# Patient Record
Sex: MALE | Race: WHITE | NOT HISPANIC OR LATINO | Employment: UNEMPLOYED | URBAN - METROPOLITAN AREA
[De-identification: names, ages, dates, MRNs, and addresses within clinical notes are randomized per-mention and may not be internally consistent; named-entity substitution may affect disease eponyms.]

---

## 2022-06-07 ENCOUNTER — TRANSFERRED RECORDS (OUTPATIENT)
Dept: HEALTH INFORMATION MANAGEMENT | Facility: CLINIC | Age: 62
End: 2022-06-07

## 2022-06-07 ENCOUNTER — HOSPITAL ENCOUNTER (EMERGENCY)
Facility: CLINIC | Age: 62
Discharge: HOME | End: 2022-06-07
Attending: EMERGENCY MEDICINE | Admitting: EMERGENCY MEDICINE
Payer: COMMERCIAL

## 2022-06-07 VITALS
WEIGHT: 175 LBS | SYSTOLIC BLOOD PRESSURE: 116 MMHG | BODY MASS INDEX: 22.46 KG/M2 | DIASTOLIC BLOOD PRESSURE: 82 MMHG | HEART RATE: 58 BPM | HEIGHT: 74 IN | RESPIRATION RATE: 15 BRPM | OXYGEN SATURATION: 96 % | TEMPERATURE: 97.2 F

## 2022-06-07 DIAGNOSIS — I48.0 PAROXYSMAL ATRIAL FIBRILLATION (H): ICD-10-CM

## 2022-06-07 LAB
ALBUMIN SERPL-MCNC: 3.6 G/DL (ref 3.4–5)
ALP SERPL-CCNC: 73 U/L (ref 40–150)
ALT SERPL W P-5'-P-CCNC: 28 U/L (ref 0–70)
ANION GAP SERPL CALCULATED.3IONS-SCNC: 6 MMOL/L (ref 3–14)
AST SERPL W P-5'-P-CCNC: 26 U/L (ref 0–45)
ATRIAL RATE - MUSE: 70 BPM
BASOPHILS # BLD AUTO: 0 10E3/UL (ref 0–0.2)
BASOPHILS NFR BLD AUTO: 1 %
BILIRUB SERPL-MCNC: 1.2 MG/DL (ref 0.2–1.3)
BUN SERPL-MCNC: 24 MG/DL (ref 7–30)
CALCIUM SERPL-MCNC: 8.7 MG/DL (ref 8.5–10.1)
CHLORIDE BLD-SCNC: 108 MMOL/L (ref 94–109)
CO2 SERPL-SCNC: 26 MMOL/L (ref 20–32)
CREAT SERPL-MCNC: 0.93 MG/DL (ref 0.66–1.25)
CREATININE (EXTERNAL): 1.1 MG/DL (ref 0.7–1.25)
DIASTOLIC BLOOD PRESSURE - MUSE: NORMAL MMHG
EOSINOPHIL # BLD AUTO: 0.2 10E3/UL (ref 0–0.7)
EOSINOPHIL NFR BLD AUTO: 3 %
ERYTHROCYTE [DISTWIDTH] IN BLOOD BY AUTOMATED COUNT: 13.1 % (ref 10–15)
GFR ESTIMATED (EXTERNAL): 72 ML/MIN/1.73M2
GFR ESTIMATED (IF AFRICAN AMERICAN) (EXTERNAL): 83 ML/MIN/1.73M2
GFR SERPL CREATININE-BSD FRML MDRD: >90 ML/MIN/1.73M2
GLUCOSE (EXTERNAL): 99 MG/DL (ref 65–99)
GLUCOSE BLD-MCNC: 95 MG/DL (ref 70–99)
HCT VFR BLD AUTO: 50.6 % (ref 40–53)
HGB BLD-MCNC: 16.7 G/DL (ref 13.3–17.7)
HOLD SPECIMEN: NORMAL
HOLD SPECIMEN: NORMAL
IMM GRANULOCYTES # BLD: 0 10E3/UL
IMM GRANULOCYTES NFR BLD: 0 %
INTERPRETATION ECG - MUSE: NORMAL
LYMPHOCYTES # BLD AUTO: 1.7 10E3/UL (ref 0.8–5.3)
LYMPHOCYTES NFR BLD AUTO: 28 %
MCH RBC QN AUTO: 29.3 PG (ref 26.5–33)
MCHC RBC AUTO-ENTMCNC: 33 G/DL (ref 31.5–36.5)
MCV RBC AUTO: 89 FL (ref 78–100)
MONOCYTES # BLD AUTO: 0.7 10E3/UL (ref 0–1.3)
MONOCYTES NFR BLD AUTO: 12 %
NEUTROPHILS # BLD AUTO: 3.5 10E3/UL (ref 1.6–8.3)
NEUTROPHILS NFR BLD AUTO: 56 %
NRBC # BLD AUTO: 0 10E3/UL
NRBC BLD AUTO-RTO: 0 /100
P AXIS - MUSE: 65 DEGREES
PLATELET # BLD AUTO: 211 10E3/UL (ref 150–450)
POTASSIUM (EXTERNAL): 4.5 MMOL/L (ref 3.5–5.3)
POTASSIUM BLD-SCNC: 4.2 MMOL/L (ref 3.4–5.3)
PR INTERVAL - MUSE: 160 MS
PROT SERPL-MCNC: 6.8 G/DL (ref 6.8–8.8)
QRS DURATION - MUSE: 72 MS
QT - MUSE: 374 MS
QTC - MUSE: 403 MS
R AXIS - MUSE: 55 DEGREES
RBC # BLD AUTO: 5.7 10E6/UL (ref 4.4–5.9)
SODIUM SERPL-SCNC: 140 MMOL/L (ref 133–144)
SYSTOLIC BLOOD PRESSURE - MUSE: NORMAL MMHG
T AXIS - MUSE: 49 DEGREES
TROPONIN I SERPL HS-MCNC: 46 NG/L
TSH SERPL DL<=0.005 MIU/L-ACNC: 1.16 MU/L (ref 0.4–4)
TSH SERPL-ACNC: 1.35 MIU/L (ref 0.4–4.5)
VENTRICULAR RATE- MUSE: 70 BPM
WBC # BLD AUTO: 6.2 10E3/UL (ref 4–11)

## 2022-06-07 PROCEDURE — 92960 CARDIOVERSION ELECTRIC EXT: CPT

## 2022-06-07 PROCEDURE — 93005 ELECTROCARDIOGRAM TRACING: CPT

## 2022-06-07 PROCEDURE — 250N000013 HC RX MED GY IP 250 OP 250 PS 637: Performed by: EMERGENCY MEDICINE

## 2022-06-07 PROCEDURE — 36415 COLL VENOUS BLD VENIPUNCTURE: CPT | Performed by: EMERGENCY MEDICINE

## 2022-06-07 PROCEDURE — 84443 ASSAY THYROID STIM HORMONE: CPT | Performed by: EMERGENCY MEDICINE

## 2022-06-07 PROCEDURE — C9803 HOPD COVID-19 SPEC COLLECT: HCPCS

## 2022-06-07 PROCEDURE — 96374 THER/PROPH/DIAG INJ IV PUSH: CPT

## 2022-06-07 PROCEDURE — 250N000011 HC RX IP 250 OP 636: Performed by: EMERGENCY MEDICINE

## 2022-06-07 PROCEDURE — 99291 CRITICAL CARE FIRST HOUR: CPT | Mod: 25

## 2022-06-07 PROCEDURE — 84484 ASSAY OF TROPONIN QUANT: CPT | Performed by: EMERGENCY MEDICINE

## 2022-06-07 PROCEDURE — 85025 COMPLETE CBC W/AUTO DIFF WBC: CPT | Performed by: EMERGENCY MEDICINE

## 2022-06-07 PROCEDURE — 80053 COMPREHEN METABOLIC PANEL: CPT | Performed by: EMERGENCY MEDICINE

## 2022-06-07 RX ORDER — PROPOFOL 10 MG/ML
INJECTION, EMULSION INTRAVENOUS DAILY PRN
Status: COMPLETED | OUTPATIENT
Start: 2022-06-07 | End: 2022-06-07

## 2022-06-07 RX ORDER — PROPOFOL 10 MG/ML
INJECTION, EMULSION INTRAVENOUS
Status: DISCONTINUED
Start: 2022-06-07 | End: 2022-06-07 | Stop reason: HOSPADM

## 2022-06-07 RX ORDER — PROPOFOL 10 MG/ML
80 INJECTION, EMULSION INTRAVENOUS ONCE
Status: DISCONTINUED | OUTPATIENT
Start: 2022-06-07 | End: 2022-06-07

## 2022-06-07 RX ADMIN — PROPOFOL 80 MG: 10 INJECTION, EMULSION INTRAVENOUS at 15:35

## 2022-06-07 RX ADMIN — APIXABAN 5 MG: 5 TABLET, FILM COATED ORAL at 16:08

## 2022-06-07 ASSESSMENT — ENCOUNTER SYMPTOMS
SHORTNESS OF BREATH: 1
BLOOD IN STOOL: 0
FEVER: 0
PALPITATIONS: 1
BACK PAIN: 0
MYALGIAS: 0
CHILLS: 0
DYSURIA: 0
ARTHRALGIAS: 0
VOMITING: 0
DIARRHEA: 0
LIGHT-HEADEDNESS: 1
NAUSEA: 0
FATIGUE: 1

## 2022-06-07 NOTE — ED PROVIDER NOTES
History     Chief Complaint:  Palpitations     HPI:  The history is provided by the patient.      Asher Cross is a 61 year old male with a history of hyperlipidemia and mitral valve prolapse who presents with fatigue, palpitations, and shortness of breath which began this morning as he was walking his dog. Shortly after his symptoms began, he called his primary clinic and was able to see his primary doctor this morning. An EKG was obtained during his visit this morning and was ultimately diagnosed with new onset atrial fibrillation. He was subsequently sent here to the ED for further evaluation. Asher reports that he walks his dog every morning and has never had these symptoms before, nor did he have an atrial fibrillation diagnosis prior to today. He has had no associated chest pain, nausea, vomiting, vision changes, back pain, left arm pain, or any other pain.  Asher does note that he went for a 10-mile run 2 days ago and felt well at that time. He also felt well yesterday and strongly believes that his symptoms are new as of this morning. Here in the ED while resting on the gurney, Asher reports that he is experiencing mild shortness of breath and lightheadedness. He denies any recent illness or symptoms such as fever, chills, generalized body aches, diarrhea, black/bloody stools, dysuria, or other stool/urine changes. He has had no known exposures to COVID-19. He does note that he takes a statin for high cholesterol as well as Cialis. He did take a Cialis last night, consumed some alcohol, and had sexual intercourse. He does not use drugs and has an average of 1 alcoholic drink per day. He denies history of surgeries.     Review of Systems   Constitutional: Positive for fatigue. Negative for chills and fever.   Eyes: Negative for visual disturbance.   Respiratory: Positive for shortness of breath.    Cardiovascular: Positive for palpitations. Negative for chest pain.   Gastrointestinal: Negative for blood in  "stool, diarrhea, nausea and vomiting.   Genitourinary: Negative for dysuria.   Musculoskeletal: Negative for arthralgias, back pain and myalgias.   Neurological: Positive for light-headedness.   All other systems reviewed and are negative.    Allergies:  The patient has no known allergies.     Medications:  Statin, unspecified  Cialis    Past Medical History:     Hyperlipidemia  Mitral valve prolapse    Social History:  The patient presents to the ED alone.  He does not use drugs.  On average, he has 1 alcoholic drink per day.    Physical Exam     Patient Vitals for the past 24 hrs:   BP Temp Temp src Pulse Resp SpO2 Height Weight   06/07/22 1500 -- -- -- 94 12 98 % -- --   06/07/22 1400 -- -- -- 85 17 98 % -- --   06/07/22 1330 -- -- -- 83 19 99 % -- --   06/07/22 1300 -- -- -- -- -- 100 % -- --   06/07/22 1245 130/89 -- -- -- -- -- -- --   06/07/22 1148 117/77 -- -- 71 -- -- 1.88 m (6' 2\") 79.4 kg (175 lb)   06/07/22 1147 -- 97.2  F (36.2  C) Temporal -- 20 100 % -- --     Physical Exam  General: Resting comfortably on the gurney  Head:  The scalp, face, and head appear normal  Eyes:  The pupils are equal, round, and reactive to light    There is no nystagmus    Extraocular muscles are intact    Conjunctivae and sclerae are normal  ENT:    The nose is normal    Pinnae are normal    The oropharynx is normal    Uvula is in the midline  Neck:  Normal range of motion    There is no rigidity noted    There is no midline cervical spine pain/tenderness    Trachea is in the midline    No mass is detected  CV:  Irregular rhythm with regular rate in the 70's-80's    Normal S1/S2, no S3    No pathological murmur detected  Resp:  Lungs are clear    There is no tachypnea    Non-labored    No rales    No wheezing   GI:  Abdomen is soft, there is no rigidity    No distension    No tympani    No rebound tenderness     Non-surgical without peritoneal features  MS:  Normal muscular tone    Symmetric motor strength    No major joint " effusions    No asymmetric leg swelling, no calf tenderness  Skin:  No rash or acute skin lesions noted  Neuro: Speech is normal and fluent  Psych:  Awake. Alert.      Normal affect.  Appropriate interactions.  Lymph: No anterior cervical lymphadenopathy noted    Emergency Department Course     ECG #1:  ECG taken at 1150, ECG read at 1351  Atrial fibrillation   RSR' or QR pattern in V1 suggests right ventricular conduction delay  Abnormal ECG  Rate 98 bpm. MI interval * ms. QRS duration 100 ms. QT/QTc 332/423 ms. P-R-T axes * 83 32.     ECG #2 (Post-Cardioversion):  ECG taken at 1536, ECG read at 1537  Sinus rhythm with marked sinus arrhythmia  Cannot rule out anterior infarct, age undetermined  Abnormal ECG  Atrial fibrillation has resolved as compared to ECG #1  Rate 70 bpm. MI interval 160 ms. QRS duration 72 ms. QT/QTc 374/403 ms. P-R-T axes 65 55 49.      Laboratory:  Labs Ordered and Resulted from Time of ED Arrival to Time of ED Departure   COMPREHENSIVE METABOLIC PANEL - Normal       Result Value    Sodium 140      Potassium 4.2      Chloride 108      Carbon Dioxide (CO2) 26      Anion Gap 6      Urea Nitrogen 24      Creatinine 0.93      Calcium 8.7      Glucose 95      Alkaline Phosphatase 73      AST 26      ALT 28      Protein Total 6.8      Albumin 3.6      Bilirubin Total 1.2      GFR Estimate >90     TROPONIN I - Normal    Troponin I High Sensitivity 46     TSH WITH FREE T4 REFLEX - Normal    TSH 1.16     CBC WITH PLATELETS AND DIFFERENTIAL    WBC Count 6.2      RBC Count 5.70      Hemoglobin 16.7      Hematocrit 50.6      MCV 89      MCH 29.3      MCHC 33.0      RDW 13.1      Platelet Count 211      % Neutrophils 56      % Lymphocytes 28      % Monocytes 12      % Eosinophils 3      % Basophils 1      % Immature Granulocytes 0      NRBCs per 100 WBC 0      Absolute Neutrophils 3.5      Absolute Lymphocytes 1.7      Absolute Monocytes 0.7      Absolute Eosinophils 0.2      Absolute Basophils 0.0       Absolute Immature Granulocytes 0.0      Absolute NRBCs 0.0     COVID-19 VIRUS (CORONAVIRUS) BY PCR        [facility]    -Cardioversion External    Date/Time: 6/7/2022 2:17 PM  Performed by: Andrews Wright MD  Authorized by: Andrews Wright MD     Risks, benefits and alternatives discussed.    ED EVALUATION:      I have performed an Emergency Department Evaluation including taking a history and physical examination, this evaluation will be documented in the electronic medical record for this ED encounter.      ASA Class: Class 1- healthy patient    Mallampati: Grade 1- soft palate, uvula, tonsillar pillars, and posterior pharyngeal wall visible    NPO Status: appropriately NPO for procedure    UNIVERSAL PROTOCOL   Site Marked: NA  Prior Images Obtained and Reviewed:  NA  Required items: Required blood products, implants, devices and special equipment available    Patient identity confirmed:  Verbally with patient and arm band  Patient was reevaluated immediately before administering moderate or deep sedation or anesthesia  Confirmation Checklist:  Patient's identity using two indicators, relevant allergies, procedure was appropriate and matched the consent or emergent situation and correct equipment/implants were available  Time out: Immediately prior to the procedure a time out was called    Universal Protocol: the Joint Commission Universal Protocol was followed    Preparation: Patient was prepped and draped in usual sterile fashion      SEDATION  Patient Sedated: Yes    Sedation Type:  Deep  Sedation:  Propofol (80 mg)  Vital signs: Vital signs monitored during sedation      PRE-PROCEDURE DETAILS:     Rhythm:  Atrial fibrillation    Electrode placement:  Anterior-posterior  Attempt one:     Cardioversion mode:  Synchronous    Waveform:  Biphasic    Shock (Joules):  150    Shock outcome:  Conversion to normal sinus rhythm  Post-procedure details:     Patient status:   Awake      PROCEDURE    Patient Tolerance:  Patient tolerated the procedure well with no immediate complications  Length of time physician/provider present for 1:1 monitoring during sedation: 10        Emergency Department Course:       Reviewed:  I reviewed nursing notes, vitals and past medical history    Assessments:  1243 Asher, medical student, obtained history and examined the patient as noted above.   1351 I obtained history and examined the patient as noted above.   1435 I rechecked the patient and explained findings.   1531 The patient was rechecked and updated. I performed the sedation and cardioversion.    Interventions:  1535 Propofol 80 mg IV    Disposition:  The patient was discharged to home.     Impression & Plan     Medical Decision Making:  Asher Cross is a 61 year old male who presents to the emergency department for evaluation of fatigue.  The patient is noted to have atrial fibrillation with a controlled ventricular response, despite no pharmacologic agents for rate control.  This can indicate underlying conduction system abnormality/disease.  The patient is noted to have paroxysmal atrial fibrillation that likely began overnight or today based on the initiation of his symptoms.  He did not have any specific palpitations but felt the onset of fatigue.  We had a long conversation about the management of acute onset atrial fibrillation versus atrial fibrillation of greater than 48 hours duration.  The patient elected to proceed with cardioversion.  He underwent successful synchronized cardioversion with 1 episode of 150 J.  His screening laboratories are normal including TSH.  I will order an outpatient echo in preparation for an outpatient consultation with electrophysiology.  The patient will be started on Eliquis for the next 30 days.  He can further discuss whether this should be continued long-term with the cardiologist.  There is no evidence of acute ischemia.        Diagnosis:    ICD-10-CM     1. Paroxysmal atrial fibrillation (H)  I48.0      Discharge Medications:  New Prescriptions    APIXABAN ANTICOAGULANT (ELIQUIS ANTICOAGULANT) 5 MG TABLET    Take 1 tablet (5 mg) by mouth 2 times daily     Scribe Disclosure:  I, Janina Harmon, am serving as a scribe at 12:43 PM on 6/7/2022 to document services personally performed by Andrews Wright MD based on my observations and the provider's statements to me.      Andrews Wright MD  06/07/22 1582

## 2022-06-07 NOTE — ED NOTES
Bed: ST03  Expected date:   Expected time:   Means of arrival:   Comments:  Room 11 cardioversion

## 2022-06-07 NOTE — DISCHARGE INSTRUCTIONS
Take Eliquis twice daily for 1 month  Follow-up with electrophysiology  I have scheduled an outpatient resting echocardiogram, they will call you to schedule this

## 2022-06-10 LAB
ATRIAL RATE - MUSE: 96 BPM
DIASTOLIC BLOOD PRESSURE - MUSE: NORMAL MMHG
INTERPRETATION ECG - MUSE: NORMAL
P AXIS - MUSE: NORMAL DEGREES
PR INTERVAL - MUSE: NORMAL MS
QRS DURATION - MUSE: 100 MS
QT - MUSE: 332 MS
QTC - MUSE: 423 MS
R AXIS - MUSE: 83 DEGREES
SYSTOLIC BLOOD PRESSURE - MUSE: NORMAL MMHG
T AXIS - MUSE: 32 DEGREES
VENTRICULAR RATE- MUSE: 98 BPM

## 2022-06-20 ENCOUNTER — MEDICAL CORRESPONDENCE (OUTPATIENT)
Dept: HEALTH INFORMATION MANAGEMENT | Facility: CLINIC | Age: 62
End: 2022-06-20

## 2022-06-21 DIAGNOSIS — I48.91 A-FIB (H): Primary | ICD-10-CM

## 2022-06-24 ENCOUNTER — HOSPITAL ENCOUNTER (OUTPATIENT)
Dept: CARDIOLOGY | Facility: CLINIC | Age: 62
Discharge: HOME | End: 2022-06-24
Attending: EMERGENCY MEDICINE | Admitting: EMERGENCY MEDICINE
Payer: COMMERCIAL

## 2022-06-24 DIAGNOSIS — I48.0 PAROXYSMAL ATRIAL FIBRILLATION (H): ICD-10-CM

## 2022-06-24 LAB — LVEF ECHO: NORMAL

## 2022-06-24 PROCEDURE — 93306 TTE W/DOPPLER COMPLETE: CPT | Mod: 26 | Performed by: INTERNAL MEDICINE

## 2022-06-24 PROCEDURE — 93306 TTE W/DOPPLER COMPLETE: CPT

## 2022-06-27 ENCOUNTER — TELEPHONE (OUTPATIENT)
Dept: CARDIOLOGY | Facility: CLINIC | Age: 62
End: 2022-06-27

## 2022-06-27 NOTE — TELEPHONE ENCOUNTER
[facility] Call Center    Phone Message    May a detailed message be left on voicemail: no     Reason for Call: Other: Asher called to request Echo results from the appointment on 6/24/2022. Please reach out to him at [phone].     Action Taken: Other: [facility]    Travel Screening: Not Applicable

## 2022-06-27 NOTE — TELEPHONE ENCOUNTER
6/27/22 Called pt and informed him pf preliminary results look normal and that final results will be discussed at OV w Dr Fonseca in July.  Pt voiced understanding and agreement with plan.   Can Saunders am

## 2022-07-20 ENCOUNTER — OFFICE VISIT (OUTPATIENT)
Dept: CARDIOLOGY | Facility: CLINIC | Age: 62
End: 2022-07-20
Attending: FAMILY MEDICINE
Payer: COMMERCIAL

## 2022-07-20 VITALS
OXYGEN SATURATION: 97 % | SYSTOLIC BLOOD PRESSURE: 121 MMHG | HEART RATE: 58 BPM | BODY MASS INDEX: 22.43 KG/M2 | DIASTOLIC BLOOD PRESSURE: 78 MMHG | WEIGHT: 180.4 LBS | HEIGHT: 75 IN

## 2022-07-20 DIAGNOSIS — I48.0 PAROXYSMAL ATRIAL FIBRILLATION (H): ICD-10-CM

## 2022-07-20 PROCEDURE — 93000 ELECTROCARDIOGRAM COMPLETE: CPT | Performed by: INTERNAL MEDICINE

## 2022-07-20 PROCEDURE — 99204 OFFICE O/P NEW MOD 45 MIN: CPT | Performed by: INTERNAL MEDICINE

## 2022-07-20 RX ORDER — ROSUVASTATIN CALCIUM 20 MG/1
20 TABLET, COATED ORAL DAILY
COMMUNITY
Start: 2022-02-18

## 2022-07-20 NOTE — PROGRESS NOTES
Service Date: 07/20/2022    REASON FOR CONSULTATION:  Paroxysmal atrial fibrillation.    HISTORY OF PRESENT ILLNESS:  The patient is a very pleasant 61-year-old gentleman with history of hyperlipidemia, who was recently seen in the emergency department with atrial fibrillation with a rapid ventricular response.    In early June, the patient developed shortness of breath as well as palpitations.  He went to his primary doctor's office where EKG showed atrial fibrillation with a ventricular rate of 102 beats per minute.  He was then sent to the emergency department where he underwent successful cardioversion.  He was placed on Eliquis following the cardioversion.  He took the Eliquis for 30 days and has not had any recurrent episodes of atrial fibrillation as far as I can tell.  EKG in the office today shows sinus rhythm.    This is the first episode of atrial fibrillation for him, but he does have a brother with AFib who had an ablation.  The brother is 5 years older than him.    He does not endorse any cardiopulmonary symptoms at this point.    ASSESSMENT AND PLAN:  A very pleasant 61-year-old gentleman with paroxysmal atrial fibrillation.  He is currently in sinus rhythm.  He does drink 2-3 drinks of alcohol daily.  I told him that he should cut down significantly or perhaps eliminate alcohol altogether.  His thyroid function test in the emergency department was normal.  He does not have risk factors for sleep apnea.    We discussed the natural history and pathophysiology of atrial fibrillation.  We also discussed indications for anticoagulation.  His CHADS-VASc score is 0.  Therefore, no indication for anticoagulation at this point.  I told him, given his young age, if he has recurrent episodes of atrial fibrillation, we will most likely refer him to our colleagues from EP for possible ablation.    It was a pleasure seeing Mr. Cross in the clinic this afternoon.    I appreciate the opportunity to participate in his  care.    Jone Chou MD        D: 2022   T: 2022   MT: celina    Name:     WILTON WAYNEDenis  MRN:      [id number]        Account:      [id number]   :      1960           Service Date: 2022       Document: [id number]

## 2022-07-20 NOTE — PROGRESS NOTES
HPI and Plan:   See dictation    Today's clinic visit entailed:  45 minutes spent on the date of the encounter doing chart review, history and exam, documentation and further activities per the note  Provider  Link to [facility] Help Grid     Orders Placed This Encounter   Procedures     EKG 12-lead complete w/read - Clinics (performed today)       Orders Placed This Encounter   Medications     rosuvastatin (CRESTOR) 20 MG tablet     Sig: Take 20 mg by mouth daily       There are no discontinued medications.      Encounter Diagnosis   Name Primary?     Paroxysmal atrial fibrillation (H)        CURRENT MEDICATIONS:  Current Outpatient Medications   Medication Sig Dispense Refill     rosuvastatin (CRESTOR) 20 MG tablet Take 20 mg by mouth daily         ALLERGIES   No Known Allergies    PAST MEDICAL HISTORY:  History reviewed. No pertinent past medical history.    PAST SURGICAL HISTORY:  History reviewed. No pertinent surgical history.    FAMILY HISTORY:  History reviewed. No pertinent family history.    SOCIAL HISTORY:  Social History     Socioeconomic History     Marital status:      Spouse name: None     Number of children: None     Years of education: None     Highest education level: None   Tobacco Use     Smoking status: Never Smoker     Smokeless tobacco: Never Used   Substance and Sexual Activity     Alcohol use: Yes     Comment: 1-2 drinks per day       Review of Systems:  Skin:  Negative bruising     Eyes:  Positive for glasses    ENT:  not assessed      Respiratory:  Negative shortness of breath;sleep apnea     Cardiovascular:  palpitations;chest pain;edema;Negative;dizziness;lightheadedness;fatigue      Gastroenterology: Negative heartburn;reflux    Genitourinary:  Negative      Musculoskeletal:  not assessed      Neurologic:  not assessed      Psychiatric:  not assessed      Heme/Lymph/Imm:  not assessed      Endocrine:  Negative thyroid disorder;diabetes      Physical Exam:  Vitals: /78   Pulse 58  "  Ht 1.892 m (6' 2.5\")   Wt 81.8 kg (180 lb 6.4 oz)   SpO2 97%   BMI 22.85 kg/m      Constitutional:  cooperative;in no acute distress        Skin:  warm and dry to the touch          Head:  normocephalic        Eyes:  conjunctivae and lids unremarkable        Lymph:      ENT:  no pallor or cyanosis        Neck:  JVP normal;no carotid bruit        Respiratory:  clear to auscultation         Cardiac: regular rhythm;no murmurs, gallops or rubs detected                pulses full and equal                                        GI:  abdomen soft;non-tender        Extremities and Muscular Skeletal:  no edema              Neurological:  no gross motor deficits        Psych:  Alert and Oriented x 3        CC  Ellie Trimble  [address] [place] [address]              "

## 2022-11-21 ENCOUNTER — TELEPHONE (OUTPATIENT)
Dept: CARDIOLOGY | Facility: CLINIC | Age: 62
End: 2022-11-21

## 2022-11-21 NOTE — TELEPHONE ENCOUNTER
Return call to patient, he is on a cancellation list for Dr. Pritchard.    Pt was seen by Dr. Chou in July, see records in Epic.  Due to CHADS VASc of 0, pt is on no anticoagulation.  He is having intermittent episodes that are less than 8 hours in duration.     Encouraged him to monitor his episodes, if they are longer than 8 hours in duration he should contact Dr. Chou's team for further recommendations until seen by Dr. Pritchard on 12-2.  He states understanding.

## 2022-11-21 NOTE — TELEPHONE ENCOUNTER
[facility] Call Center    Phone Message    May a detailed message be left on voicemail: yes     Reason for Call: Other: Pt would like  a call back as he made an appt for Dec 2nd but feels he should be seen asap as he had an afib episode the other day and is concerned and stated he was referred by his friend Fortunato Hernandez to see Dr. Pritchard     Action Taken: Message routed to:  [facility] Cardio    Travel Screening: Not Applicable

## 2022-12-01 PROBLEM — I34.1 MITRAL VALVE PROLAPSE: Status: ACTIVE | Noted: 2022-12-01

## 2022-12-01 PROBLEM — I48.0 PAROXYSMAL ATRIAL FIBRILLATION (H): Status: ACTIVE | Noted: 2022-12-01

## 2022-12-01 NOTE — PROGRESS NOTES
"  [facility]  Cardiac Electrophysiology     Consultation Note: Karan Pritchard MD    Primary Care: [facility]    Primary Cardiology: Jone Chou MD      Thank you, Dr. Evans, [facility], for asking the [facility] Cardiac Arrhythmia care team to see . Asher Cross to evaluate treatment options for paroxysmal atrial fibrillation.    Assessment/Recommendations   Assessment:      Paroxysmal atrial fibrillation: Initially diagnosed in June 2022 when developed symptoms of weakness, shortness of breath, and palpitations.  He was successfully cardioverted at that time and currently reports he had a recurrent episode of the same symptoms 2 weeks ago that lasted all day and spontaneously converted overnight.  The patient has a [id number]-ADDn score of 0 and currently is not on a oral anticoagulant or low-dose aspirin.  I discussed the underlying pathophysiology of his condition including the role for medication options including \"pill in the pocket\" as well as daily suppressive medication.  Additionally we discussed the role for mapping/ablation of his arrhythmia in the risk and expected outcomes.  The patient is not inclined to pursue long-term medical therapy to treat his arrhythmia.  He does have some familiarity with mapping and ablation of the arrhythmia and is quite interested in potentially moving forward.    Snoring: The patient does have some snoring and this is confirmed by his wife.  Given the apparent nocturnal association with his event onsets I would recommend that he undergo formal sleep testing to establish whether or not he has NALINI.    Mitral valve prolapse: Diagnosed several years ago.  The patient most recently underwent echocardiographic evaluation in June of this year and the study demonstrated no significant mitral valve abnormalities.     Plan:    The patient will be contacted next week to determine if he wishes to move forward " with mapping and ablation of his arrhythmia.    I have scheduled the patient for a stress echo.  If this study is negative we could prescribe flecainide 50 mg to be taken at the onset atrial fibrillation in combination with metoprolol 25 mg.    Recommend that the patient start aspirin 81 mg daily.    Follow-up with the EP BEN in 8 weeks     History of Present Illness/Subjective    Mr. Asher Cross is a 62 year old male with history of atrial fibrillation diagnosed earlier this summer.  The patient reports that he had gone out for a long training run (10 miles) the previous day and awakening the next morning not feeling quite right.  He went to a [employer] where walking up a slight hill resulted in feeling significant weakness, shortness of breath, and palpitations.  On returning home he checked his heart rate monitor which showed heart rates in the 120-140 bpm range and he went to the emergency room.  The patient underwent successful cardioversion at that time.  Additionally the patient reports that 2 weeks ago he had another episode of atrial fibrillation which he noted in the morning when he awoke and had the same symptoms of being fatigued, winded, and having chest fluttering.  This lasted all day and apparently resolved at some point overnight as he awakened the next day back in normal rhythm.  On further reflection the patient thinks that he has had previous episodes of this which did not last as long or result in him seeking medical care.  Patient has no history of syncope.  Does snore and his wife confirms this when he called her during our office visit.    ECG:   Personally reviewed.  Tracing dated 6/7/2022 demonstrates atrial fibrillation with ventricular response but 100 bpm.  No other acute changes.  Follow-up tracing that same day demonstrates sinus rhythm.    ECHO:   Dated: 6/24/2022  The left ventricle is normal in size.   Left ventricular systolic function is normal.   The visual ejection fraction is  "55-60%.   No regional wall motion abnormalities noted.   Diastolic Doppler findings (E/E' ratio and/or other parameters) suggest left   ventricular filling pressures are normal.   The aortic valve is trileaflet with aortic valve sclerosis.   There is trace aortic regurgitation.   Sinus rhythm was noted.   There is no comparison study available.     Other Studies:       Time spent: 65 minutes spent on the date of the encounter doing chart review, history and exam, documentation and further activities as noted above.       Physical Examination Review of Systems   /76 (BP Location: Right arm, Patient Position: Sitting, Cuff Size: Adult Regular)   Pulse 66   Resp 16   Ht 1.905 m (6' 3\")   Wt 82.6 kg (182 lb)   SpO2 98%   BMI 22.75 kg/m      Wt Readings from Last 3 Encounters:   07/20/22 81.8 kg (180 lb 6.4 oz)   06/07/22 79.4 kg (175 lb)         General     Appearance:   The patient is alert oriented to person place and situation.    The patient is in no acute distress at the time of my evaluation.   HEENT:  Pupils are equal, round, and reactive to light.  Conjunctiva and sclera are clear.  ENT: Oral mucosa is moist and without redness. No evident nasal discharge.   Neck: Without palpable thyroid or appreciable lymph nodes.   Chest: Symmetric, without deformity.   Lungs:   Clear bilaterally with no rales, rhonchi, or wheezes.     Cardiovascular:   Rhythm is regular. S1 and S2 are normal. No significant murmur is present. JVP is normal. Lower extremities demonstrate no significant edema. Distal pulses are intact bilaterally.   Abdomen:  Soft, non-tender, and without hepatosplenomegaly. Bowel sounds present.   Extremities: Without deformity.   Skin: Skin is warm, dry, and otherwise intact.   Neurologic: Gait is normal.           A 12 point comprehensive review of systems was negative except as noted.      Medical History  Surgical History Family History Social History   Past Medical History:   Diagnosis Date "     Paroxysmal atrial fibrillation (H) 12/1/2022    Dx Jun 2022 [id number] score = 0     No past surgical history on file. No family history on file. Social History     Socioeconomic History     Marital status:      Spouse name: Not on file     Number of children: Not on file     Years of education: Not on file     Highest education level: Not on file   Occupational History     Not on file   Tobacco Use     Smoking status: Never     Smokeless tobacco: Never   Substance and Sexual Activity     Alcohol use: Yes     Comment: 1-2 drinks per day     Drug use: Not on file     Sexual activity: Not on file   Other Topics Concern     Not on file   Social History Narrative     Not on file     Social Determinants of Health     Financial Resource Strain: Not on file   Food Insecurity: Not on file   Transportation Needs: Not on file   Physical Activity: Not on file   Stress: Not on file   Social Connections: Not on file   Intimate Partner Violence: Not on file   Housing Stability: Not on file          Medications  Allergies   Scheduled Meds:  Current Outpatient Medications   Medication Sig Dispense Refill     rosuvastatin (CRESTOR) 20 MG tablet Take 20 mg by mouth daily      No Known Allergies      Lab Results    Chemistry/lipid CBC Cardiac Enzymes/BNP/TSH/INR   Lab Results   Component Value Date    BUN 24 06/07/2022     06/07/2022    CO2 26 06/07/2022    Lab Results   Component Value Date    WBC 6.2 06/07/2022    HGB 16.7 06/07/2022    HCT 50.6 06/07/2022    MCV 89 06/07/2022     06/07/2022    @RESUFAST(BMP,CBC,BNP,TSH,  INR)@      Karan Pritchard MD  Clinical Cardiac Electrophysiologist  [facility]

## 2022-12-02 ENCOUNTER — OFFICE VISIT (OUTPATIENT)
Dept: CARDIOLOGY | Facility: CLINIC | Age: 62
End: 2022-12-02
Payer: COMMERCIAL

## 2022-12-02 VITALS
OXYGEN SATURATION: 98 % | HEART RATE: 66 BPM | SYSTOLIC BLOOD PRESSURE: 124 MMHG | BODY MASS INDEX: 22.63 KG/M2 | WEIGHT: 182 LBS | RESPIRATION RATE: 16 BRPM | HEIGHT: 75 IN | DIASTOLIC BLOOD PRESSURE: 76 MMHG

## 2022-12-02 DIAGNOSIS — I48.0 PAROXYSMAL ATRIAL FIBRILLATION (H): Primary | ICD-10-CM

## 2022-12-02 DIAGNOSIS — I34.1 MITRAL VALVE PROLAPSE: ICD-10-CM

## 2022-12-02 DIAGNOSIS — R06.83 SNORING: ICD-10-CM

## 2022-12-02 PROCEDURE — 99205 OFFICE O/P NEW HI 60 MIN: CPT | Performed by: INTERNAL MEDICINE

## 2022-12-02 RX ORDER — BNT162B2 0.23 MG/2.25ML
INJECTION, SUSPENSION INTRAMUSCULAR
COMMUNITY
Start: 2022-06-29 | End: 2023-03-21

## 2022-12-02 NOTE — LETTER
"12/2/2022    [facility]  [address], [address]  [place] [address]    RE: Asher Cross       Dear Colleague,     I had the pleasure of seeing Asher Cross in the [facility].    [facility] Care  Cardiac Electrophysiology     Consultation Note: Karan Pritchard MD    Primary Care: [facility]    Primary Cardiology: Jone Chou MD      Thank you, Dr. Evans, [facility], for asking the [facility] Cardiac Arrhythmia care team to see . Asher Cross to evaluate treatment options for paroxysmal atrial fibrillation.    Assessment/Recommendations   Assessment:      Paroxysmal atrial fibrillation: Initially diagnosed in June 2022 when developed symptoms of weakness, shortness of breath, and palpitations.  He was successfully cardioverted at that time and currently reports he had a recurrent episode of the same symptoms 2 weeks ago that lasted all day and spontaneously converted overnight.  The patient has a [id number] score of 0 and currently is not on a oral anticoagulant or low-dose aspirin.  I discussed the underlying pathophysiology of his condition including the role for medication options including \"pill in the pocket\" as well as daily suppressive medication.  Additionally we discussed the role for mapping/ablation of his arrhythmia in the risk and expected outcomes.  The patient is not inclined to pursue long-term medical therapy to treat his arrhythmia.  He does have some familiarity with mapping and ablation of the arrhythmia and is quite interested in potentially moving forward.    Snoring: The patient does have some snoring and this is confirmed by his wife.  Given the apparent nocturnal association with his event onsets I would recommend that he undergo formal sleep testing to establish whether or not he has NALINI.    Mitral valve prolapse: Diagnosed several years ago.  The patient most " recently underwent echocardiographic evaluation in June of this year and the study demonstrated no significant mitral valve abnormalities.     Plan:    The patient will be contacted next week to determine if he wishes to move forward with mapping and ablation of his arrhythmia.    I have scheduled the patient for a stress echo.  If this study is negative we could prescribe flecainide 50 mg to be taken at the onset atrial fibrillation in combination with metoprolol 25 mg.    Recommend that the patient start aspirin 81 mg daily.    Follow-up with the EP BEN in 8 weeks     History of Present Illness/Subjective    Mr. Asher Cross is a 62 year old male with history of atrial fibrillation diagnosed earlier this summer.  The patient reports that he had gone out for a long training run (10 miles) the previous day and awakening the next morning not feeling quite right.  He went to a dog park where walking up a slight hill resulted in feeling significant weakness, shortness of breath, and palpitations.  On returning home he checked his heart rate monitor which showed heart rates in the 120-140 bpm range and he went to the emergency room.  The patient underwent successful cardioversion at that time.  Additionally the patient reports that 2 weeks ago he had another episode of atrial fibrillation which he noted in the morning when he awoke and had the same symptoms of being fatigued, winded, and having chest fluttering.  This lasted all day and apparently resolved at some point overnight as he awakened the next day back in normal rhythm.  On further reflection the patient thinks that he has had previous episodes of this which did not last as long or result in him seeking medical care.  Patient has no history of syncope.  Does snore and his wife confirms this when he called her during our office visit.    ECG:   Personally reviewed.  Tracing dated 6/7/2022 demonstrates atrial fibrillation with ventricular response but 100 bpm.   "No other acute changes.  Follow-up tracing that same day demonstrates sinus rhythm.    ECHO:   Dated: 6/24/2022  The left ventricle is normal in size.   Left ventricular systolic function is normal.   The visual ejection fraction is 55-60%.   No regional wall motion abnormalities noted.   Diastolic Doppler findings (E/E' ratio and/or other parameters) suggest left   ventricular filling pressures are normal.   The aortic valve is trileaflet with aortic valve sclerosis.   There is trace aortic regurgitation.   Sinus rhythm was noted.   There is no comparison study available.     Other Studies:       Time spent: 65 minutes spent on the date of the encounter doing chart review, history and exam, documentation and further activities as noted above.       Physical Examination Review of Systems   /76 (BP Location: Right arm, Patient Position: Sitting, Cuff Size: Adult Regular)   Pulse 66   Resp 16   Ht 1.905 m (6' 3\")   Wt 82.6 kg (182 lb)   SpO2 98%   BMI 22.75 kg/m      Wt Readings from Last 3 Encounters:   07/20/22 81.8 kg (180 lb 6.4 oz)   06/07/22 79.4 kg (175 lb)         General     Appearance:   The patient is alert oriented to person place and situation.    The patient is in no acute distress at the time of my evaluation.   HEENT:  Pupils are equal, round, and reactive to light.  Conjunctiva and sclera are clear.  ENT: Oral mucosa is moist and without redness. No evident nasal discharge.   Neck: Without palpable thyroid or appreciable lymph nodes.   Chest: Symmetric, without deformity.   Lungs:   Clear bilaterally with no rales, rhonchi, or wheezes.     Cardiovascular:   Rhythm is regular. S1 and S2 are normal. No significant murmur is present. JVP is normal. Lower extremities demonstrate no significant edema. Distal pulses are intact bilaterally.   Abdomen:  Soft, non-tender, and without hepatosplenomegaly. Bowel sounds present.   Extremities: Without deformity.   Skin: Skin is warm, dry, and otherwise " intact.   Neurologic: Gait is normal.           A 12 point comprehensive review of systems was negative except as noted.      Medical History  Surgical History Family History Social History   Past Medical History:   Diagnosis Date     Paroxysmal atrial fibrillation (H) 12/1/2022    Dx Jun 2022 [id number] score = 0     No past surgical history on file. No family history on file. Social History     Socioeconomic History     Marital status:      Spouse name: Not on file     Number of children: Not on file     Years of education: Not on file     Highest education level: Not on file   Occupational History     Not on file   Tobacco Use     Smoking status: Never     Smokeless tobacco: Never   Substance and Sexual Activity     Alcohol use: Yes     Comment: 1-2 drinks per day     Drug use: Not on file     Sexual activity: Not on file   Other Topics Concern     Not on file   Social History Narrative     Not on file     Social Determinants of Health     Financial Resource Strain: Not on file   Food Insecurity: Not on file   Transportation Needs: Not on file   Physical Activity: Not on file   Stress: Not on file   Social Connections: Not on file   Intimate Partner Violence: Not on file   Housing Stability: Not on file          Medications  Allergies   Scheduled Meds:  Current Outpatient Medications   Medication Sig Dispense Refill     rosuvastatin (CRESTOR) 20 MG tablet Take 20 mg by mouth daily      No Known Allergies      Lab Results    Chemistry/lipid CBC Cardiac Enzymes/BNP/TSH/INR   Lab Results   Component Value Date    BUN 24 06/07/2022     06/07/2022    CO2 26 06/07/2022    Lab Results   Component Value Date    WBC 6.2 06/07/2022    HGB 16.7 06/07/2022    HCT 50.6 06/07/2022    MCV 89 06/07/2022     06/07/2022    @RESUFAST(BMP,CBC,BNP,TSH,  INR)@      Karan Pritchard MD  Clinical Cardiac Electrophysiologist  [facility]      Thank you for allowing me to participate in the care of  your patient.      Sincerely,     Karan Pritchard MD     [facility]  cc:   Referred Self,

## 2022-12-02 NOTE — Clinical Note
Hi team, Please see my note from today. Please ask him to start aspirin 81 mg daily. Also ask him if he is willing to be set up in the sleep clinic for formal evaluation of possible NALINI. If he does want to move forward with scheduling ablation of his atrial fibrillation okay to set him up as a double up. Thanks  Karan

## 2022-12-05 ENCOUNTER — TELEPHONE (OUTPATIENT)
Dept: CARDIOLOGY | Facility: CLINIC | Age: 62
End: 2022-12-05

## 2022-12-05 DIAGNOSIS — I48.0 PAROXYSMAL ATRIAL FIBRILLATION (H): Primary | ICD-10-CM

## 2022-12-05 NOTE — TELEPHONE ENCOUNTER
PVI Referral Request    Dr Pritchard-    PVI referral request from Dr Pritchard  Pt has PAF    If agreeable will schedule as follows:  Consult: already met you, 12/2  SHAD: Echo ordered, results pending  PV Imaging: Per guidelines no imaging needing, as this is pts 1st PVI  Anticoagulation Status: Low [id number] score (0 or 1), ok to start Eliquis 1 wk prior to PVI?  AAD: n/a  PPI: start Protonix 40mg Daily 3 days prior, and continue s/p for 6 wks  Scheduling Info: Dr Pritchard preferance-2:1 day  Mapping: Dr Pritchard preference- LIT, unless indicated other wise      Ok to to order/schedule with you?  Please advise  Thank you  Aminata Santana, RN  12/5/2022 3:08 PM

## 2022-12-05 NOTE — TELEPHONE ENCOUNTER
Karan Pritchard MD  P [facility] Ep Support [facility] team,   Please see my note from today.   Please ask him to start aspirin 81 mg daily.   Also ask him if he is willing to be set up in the sleep clinic for formal evaluation of possible NALINI.   If he does want to move forward with scheduling ablation of his atrial fibrillation okay to set him up as a double up.   Thanks   Karan

## 2022-12-12 ENCOUNTER — TELEPHONE (OUTPATIENT)
Dept: CARDIOLOGY | Facility: CLINIC | Age: 62
End: 2022-12-12

## 2022-12-12 DIAGNOSIS — I48.0 PAROXYSMAL ATRIAL FIBRILLATION (H): Primary | ICD-10-CM

## 2022-12-12 NOTE — TELEPHONE ENCOUNTER
Return call to patient. He has a few questions regarding PVI, he states he had a long episode of afib yesterday, he is wondering about a possible medication to help.  Chart reviewed, pt was seen by MARY on 12-5-22.  Stress echo ordered and if negative, ok to start Flecainide/Metoprolol PIP.  Pt is scheduled for 12-19-22.  Note postponed to review stress test results.    Pt states understanding.

## 2022-12-12 NOTE — TELEPHONE ENCOUNTER
[facility] Call Center    Phone Message    May a detailed message be left on voicemail: yes     Reason for Call: Other: PT called back and will be expecting a call back      Action Taken: Other: Cardiology    Travel Screening: Not Applicable     Thank you!  [facility]

## 2022-12-12 NOTE — TELEPHONE ENCOUNTER
[facility] Call Center    Phone Message    May a detailed message be left on voicemail: yes     Reason for Call: Other: patient had A-Fib over the weekened. he has a few questions though.  He feels a little better, but said there was a pill Dr Pritchard spoke about that would help. Can the patient also give blood, with his blood type along with cardiac issues. Please reach out.     Action Taken: Other: Cardio

## 2022-12-12 NOTE — TELEPHONE ENCOUNTER
1st Attempt to contact pt, voicemail message was left with contact information and instructing pt to call back.  12/12/2022 10:13 AM  Aminata Santana RN

## 2022-12-13 ENCOUNTER — DOCUMENTATION ONLY (OUTPATIENT)
Dept: CARDIOLOGY | Facility: CLINIC | Age: 62
End: 2022-12-13

## 2022-12-13 DIAGNOSIS — I48.0 PAROXYSMAL ATRIAL FIBRILLATION (H): Primary | ICD-10-CM

## 2022-12-13 NOTE — PROGRESS NOTES
H&P Date: Teach Date: PVI  Clinic No SHAD No CT  MRI No   PVI  Order Case Req [x]  Order Set [] Lab/EKG   []  Orders Letter [] F/U RN Date:  Order NP follow-up Date:     AC Eliquis- START 7 days prior     AAD None-NA   PPI Start Protonix 40mg Daily 3 days prior, 6wk post     1960  Home:[phone] (home) Cell:[phone] (mobile)  Emergency Contact: Sara Cross   PCP: Clinic, [facility] [employer] [facility], [phone]    Important patient information for CSC/Cath Lab staff : None    [facility] EP Cath Lab Procedure Order   Ablation Type:  PVI- Atrial Fibrillation  Diagnosis:  AF  Ordering Provider: Dr Pritchard  Ordering Date: 12/13/2022    Scheduling Information:  Anticipated Case Duration:  ok for double up   Scheduling Timeframe:  Next Available  Clinic Arrangements prior to PVI: Schedule pt directly for PVI procedure with designated MD listed below, pt to see EP NP only for H&P and EP RN for education prior  Scheduling Restrictions: None  Scheduling Contact: Pt is aware of scheduling limitations at this time due to schedule, please call pt when schedule is available  EP RN Follow Up Apt: Schedule EP RN PC visit 3-4 days s/p PVI  MD Preference: Scheduling with ordering provider  Current Device/Device Co Needed for Procedure: None NoneNone  Pre-Procedural Testing needed: Echo  Mapping System Required:  LIT (Dr Pritchard)  ICE Needed:  Yes  Anesthesia:General Anesthesia    [facility] EP Cath Lab Prep   H&P:  Schedule H&P with EP BEN, RN Teach, and Labs within 30 days of PVI  Pre-op Labs: CBC, BMP, Beta HcG if appropriate, and INR if on Warfarin will be ordered AM of procedure and Review of most recent labs, WEL for procedure  T&S Pre-Procedure Review: All PVI- T&S required to be drawn at time of H&P with the use of an extension form, or within 3 days of procedure if H&P previously completed  Medical Records Pertinent for Procedure:  Echo 6/24/22  Allergies: Reviewed allergies, no concerns regarding orders for procedure    No  Known Allergies    Current Outpatient Medications:      PFIZER-[employer] COVID-19 VAC-GLORY 30 MCG/0.3ML injection, , Disp: , Rfl:      rosuvastatin (CRESTOR) 20 MG tablet, Take 20 mg by mouth daily, Disp: , Rfl:     Documentation Date:12/13/2022 9:44 AM  Aminata Santana RN

## 2022-12-13 NOTE — TELEPHONE ENCOUNTER
Karan Pritchard MD Westlund, Jessica, RN  Caller: Unspecified (1 week ago)  Aelx Calderon to schedule AF ablation as outlined.   Thanks   S

## 2022-12-19 ENCOUNTER — HOSPITAL ENCOUNTER (OUTPATIENT)
Dept: CARDIOLOGY | Facility: HOSPITAL | Age: 62
Discharge: HOME | End: 2022-12-19
Attending: INTERNAL MEDICINE | Admitting: INTERNAL MEDICINE
Payer: COMMERCIAL

## 2022-12-19 DIAGNOSIS — I48.0 PAROXYSMAL ATRIAL FIBRILLATION (H): ICD-10-CM

## 2022-12-19 PROCEDURE — 93321 DOPPLER ECHO F-UP/LMTD STD: CPT | Mod: 26 | Performed by: INTERNAL MEDICINE

## 2022-12-19 PROCEDURE — 93325 DOPPLER ECHO COLOR FLOW MAPG: CPT | Mod: 26 | Performed by: INTERNAL MEDICINE

## 2022-12-19 PROCEDURE — 93018 CV STRESS TEST I&R ONLY: CPT | Performed by: INTERNAL MEDICINE

## 2022-12-19 PROCEDURE — 93321 DOPPLER ECHO F-UP/LMTD STD: CPT | Mod: TC

## 2022-12-19 PROCEDURE — 93016 CV STRESS TEST SUPVJ ONLY: CPT | Performed by: INTERNAL MEDICINE

## 2022-12-19 PROCEDURE — 93325 DOPPLER ECHO COLOR FLOW MAPG: CPT | Mod: TC

## 2022-12-19 PROCEDURE — 93350 STRESS TTE ONLY: CPT | Mod: 26 | Performed by: INTERNAL MEDICINE

## 2022-12-20 NOTE — TELEPHONE ENCOUNTER
Dr. Pritchard,     See stress results from 12/19,    ok to prescribe flecainide 50 mg to be taken at the onset atrial fibrillation in combination with metoprolol 25 mg?  Repeat instructions?    Thank you  Yumiko

## 2022-12-22 RX ORDER — FLECAINIDE ACETATE 50 MG/1
50 TABLET ORAL PRN
Qty: 30 TABLET | Refills: 1 | Status: SHIPPED | OUTPATIENT
Start: 2022-12-22 | End: 2023-01-31

## 2022-12-22 RX ORDER — METOPROLOL TARTRATE 25 MG/1
25 TABLET, FILM COATED ORAL PRN
Qty: 30 TABLET | Refills: 1 | Status: SHIPPED | OUTPATIENT
Start: 2022-12-22 | End: 2023-03-21

## 2022-12-22 NOTE — TELEPHONE ENCOUNTER
Noted, spoke to pt regarding results and recommendations, he states understanding.  Medications sent to pharmacy for pt to .    No further questions or concerns at this time.    Ce

## 2022-12-22 NOTE — TELEPHONE ENCOUNTER
Karan Pritchard MD Westlund, Jessica, RN; P [facility] Support Pool - [facility]  Caller: Unspecified (1 week ago)  Patient stress echo imaging study is reviewed.   There is no evidence of fixed or reversible ischemic defect.   Baseline and exercise LV function is normal.   Okay for patient to initiate pill in the pocket medical therapy for recurrent atrial fibrillation.   Flecainide 50 mg + metoprolol 25 mg at onset of atrial fibrillation.  Patient may repeat the same medication/dose at 4 hours if the patient remains in atrial fibrillation.   Thanks   Karan

## 2022-12-29 ENCOUNTER — TELEPHONE (OUTPATIENT)
Dept: SLEEP MEDICINE | Facility: CLINIC | Age: 62
End: 2022-12-29

## 2022-12-29 NOTE — TELEPHONE ENCOUNTER
Phone call returned to patient and explained provider has many years of experience in the healthcare. Provider also work at all areas of specialities. Provider was also last with sleep medicine at [place], [place] in [phone]. Patient reports he is having ablation surgery and in order to be clear he needed to see by sleep provider. Patient also asked if a video visit cost about $300. Explained to patient not sure of the cost of visit and I can connect him to someone in the billing department.     Patient had no further questions.     REVA Bradshaw  [facility] [place]

## 2023-01-19 ENCOUNTER — VIRTUAL VISIT (OUTPATIENT)
Dept: SLEEP MEDICINE | Facility: CLINIC | Age: 63
End: 2023-01-19
Attending: INTERNAL MEDICINE
Payer: COMMERCIAL

## 2023-01-19 VITALS — BODY MASS INDEX: 21.76 KG/M2 | HEIGHT: 75 IN | WEIGHT: 175 LBS

## 2023-01-19 DIAGNOSIS — G47.30 SLEEP-DISORDERED BREATHING: Primary | ICD-10-CM

## 2023-01-19 DIAGNOSIS — I48.0 PAROXYSMAL ATRIAL FIBRILLATION (H): ICD-10-CM

## 2023-01-19 PROCEDURE — 99215 OFFICE O/P EST HI 40 MIN: CPT | Mod: 95 | Performed by: NURSE PRACTITIONER

## 2023-01-19 ASSESSMENT — SLEEP AND FATIGUE QUESTIONNAIRES
HOW LIKELY ARE YOU TO NOD OFF OR FALL ASLEEP WHILE WATCHING TV: SLIGHT CHANCE OF DOZING
HOW LIKELY ARE YOU TO NOD OFF OR FALL ASLEEP WHEN YOU ARE A PASSENGER IN A CAR FOR AN HOUR WITHOUT A BREAK: WOULD NEVER DOZE
HOW LIKELY ARE YOU TO NOD OFF OR FALL ASLEEP IN A CAR, WHILE STOPPED FOR A FEW MINUTES IN TRAFFIC: WOULD NEVER DOZE
HOW LIKELY ARE YOU TO NOD OFF OR FALL ASLEEP WHILE LYING DOWN TO REST IN THE AFTERNOON WHEN CIRCUMSTANCES PERMIT: WOULD NEVER DOZE
HOW LIKELY ARE YOU TO NOD OFF OR FALL ASLEEP WHILE SITTING AND TALKING TO SOMEONE: WOULD NEVER DOZE
HOW LIKELY ARE YOU TO NOD OFF OR FALL ASLEEP WHILE SITTING INACTIVE IN A PUBLIC PLACE: WOULD NEVER DOZE
HOW LIKELY ARE YOU TO NOD OFF OR FALL ASLEEP WHILE SITTING AND READING: WOULD NEVER DOZE
HOW LIKELY ARE YOU TO NOD OFF OR FALL ASLEEP WHILE SITTING QUIETLY AFTER LUNCH WITHOUT ALCOHOL: WOULD NEVER DOZE

## 2023-01-19 ASSESSMENT — PAIN SCALES - GENERAL: PAINLEVEL: NO PAIN (0)

## 2023-01-19 NOTE — PROGRESS NOTES
Video-Visit Details     Type of service:  Video Visit   Video Start Time: 1:00 PM  Video End Time:2:00 PM    Originating Location (pt. Location): Home  Distant Location (provider location):  off-site  Platform used for Video Visit: [facility]         Outpatient Sleep Medicine Consultation:      Name: Asher Cross MRN# [id number]   Age: 62 year old YOB: 1960     Date of Consultation: January 19, 2023  Consultation is requested by: Karan Pritchard MD  [address]  [place] [address] Karan Pritchard  Primary care provider: [facility]       Reason for Sleep Consult:     Asher Cross is sent by Karan Pritchard for a sleep consultation regarding patient developed atrial fibrillation in June 2022.    Patient s Reason for visit  Asher Cross main reason for visit:  Upon the urging of his cardiologist  Patient states problem(s) started:  June, 2022  Asher Cross's goals for this visit:  Learn more about the relationship between atrial fibrillation and obstructive sleep apnea           Assessment and Plan:     Summary Sleep Diagnoses:  Sleep disordered breathing  Socially disruptive snoring  Nocturnal episodes of tachycardia    Comorbid Diagnoses:  Atrial fibrillation  Hypercholesterolemia      Summary Recommendations:  Orders Placed This Encounter   Procedures     Comprehensive Sleep Study   Recommend in lab sleep study to effectively evaluate for any episodes of atrial fibrillation, as well as determine presence or absence of obstructive sleep apnea.  Follow-up with virtual or in person visit approximately 2 weeks after sleep study has been completed.  We will review his results and determine next steps in refining his plan of care.    Summary Counseling:    Pathophysiology of obstructive sleep apnea as well as central sleep apnea reviewed with patient  Consequences of untreated sleep obstructive sleep apnea, in particular atrial fibrillation and overall cardiovascular  disease were reviewed with patient specific to his diagnoses.  Processes in laboratory overnight PSG process was reviewed with patient  Treatment measures for obstructive sleep apnea including, but not limited to, PAP therapy, mandibular advancement devices, and surgical options including referral to ENT for surgery as well as inspire therapy were reviewed with patient.  Discussed effects of BMI on obstructive sleep apnea as well as alcohol intake.    Medical Decision-making:   Educational materials provided in after visit instructions    Total time spent reviewing medical records, history and physical examination, review of previous testing and interpretation as well as documentation on this date:40    CC: Karan Pritchard          History of Present Illness:   Asher Cross is a 62-year-old  male who presents to the sleep medicine clinic on the advice of his cardiologist, Karan Pritchard MD.  Mr. Cross was diagnosed with atrial fibrillation with rapid ventricular response through an ED visit on 6/7/2022.  He presented with fatigue, palpitations, shortness of breath that he noticed while he was walking his dog.  He underwent synchronized cardioversion in the emergency department, placed on an anticoagulant, and was discharged home with instructions to follow-up with cardiology.  Patient noticed a return of his symptoms approximately 2 weeks before he saw his cardiologist noting he had a return of his symptoms which lasted all day and spontaneously converted overnight.  It is noted in his medical records the onset of his fast heart rates is typically during the night or after sleep.  Patient at this point is in sinus rhythm, as documented by a twelve-lead EKG.    Asher is interested in mapping/ablation of his arrhythmia with an interventional cardiologist.  Patient is also highly motivated to undertake any measures he needs to for optimal health, both general, cardiac as well as sleep health.  Of note,  Asher is in  otherwise excellent physical health, as he is an avid runner.    Past Sleep Evaluations: None    SLEEP-WAKE SCHEDULE:     Of note, Mr. Cross did not complete his sleep history intake form prior to the visit.  Information was taken during our visit and may not all be complete.    Work/School Days: Patient goes to school/work:     Usually gets into bed at   2100  Takes patient about   to fall asleep 30  Has trouble falling asleep   nights per week 0.5  Wakes up in the middle of the night   times. 1  Wakes up due to  needs to use bathroom  He has trouble falling back asleep   times a week. 0  It usually takes   to get back to sleep 10 minutes  Patient is usually up at  4:30 am  Uses alarm:  Yes    Weekends/Non-work Days/All Other Days:  Usually gets into bed at   11 pm  Takes patient about   to fall asleep 30 minutes  Patient is usually up at  5:30- 6:00 am  Uses alarm:  Yes    Sleep Need  Patient gets    sleep on average 7 hours  Patient thinks he needs about   sleep 7 hours    Asher Cross prefers to sleep in this position(s):  Laterally, wife says patient sleeps supine   Patient states they do the following activities in bed:  TV, phone, read, eat    Naps  Patient takes a purposeful nap   times a week and naps are usually  - in duration  He feels better after a nap:   NA  He dozes off unintentionally   1 days per week  Patient has had a driving accident or near-miss due to sleepiness/drowsiness:  0      SLEEP DISRUPTIONS:    Breathing/Snoring  Patient snores: No  Other people complain about his snoring:  No  Patient has been told he stops breathing in his sleep:  No  He has issues with the following:  NA    Movement:  Patient gets pain, discomfort, with an urge to move:   No  It happens when he is resting:     It happens more at night:     Patient has been told he kicks his legs at night:        Behaviors in Sleep:  Asher Cross has experienced the following behaviors while sleeping:   None  He has experienced sudden  muscle weakness during the day:   None      Is there anything else you would like your sleep provider to know:          CAFFEINE AND OTHER SUBSTANCES:    Patient consumes caffeinated beverages per day:     Last caffeine use is usually:  2 cups of coffee in the am  List of any prescribed or over the counter stimulants that patient takes:  None  List of any prescribed or over the counter sleep medication patient takes:  None  List of previous sleep medications that patient has tried:     Ambien  Patient drinks alcohol to help them sleep:  No  Patient drinks alcohol near bedtime:  wine with meals (6-7); cocktail 5:15 pm    Family History:  Patient has a family member been diagnosed with a sleep disorder:     Father, Brother had BiPAP machine         SCALES:    EPWORTH SLEEPINESS SCALE      Ter[place]on Sleepiness Scale ( ANDRÉS Spaulding  1990-1997[facility] [place]/English - Final version - 21 Nov 07 - [facility] [employer].) 1/19/2023   Sitting and reading Would never doze   Watching TV Slight chance of dozing   Sitting, inactive in a public place (e.g. a theatre or a meeting) Would never doze   As a passenger in a car for an hour without a break Would never doze   Lying down to rest in the afternoon when circumstances permit Would never doze   Sitting and talking to someone Would never doze   Sitting quietly after a lunch without alcohol Would never doze   In a car, while stopped for a few minutes in traffic Would never doze   Terreton Score (MC) 1   Terreton Score (Sleep) 1         INSOMNIA SEVERITY INDEX (VLAD)      Insomnia Severity Index (VLAD) 1/19/2023   Difficulty falling asleep 0   Difficulty staying asleep 0   Problems waking up too early 0   How SATISFIED/DISSATISFIED are you with your CURRENT sleep pattern? 0   How NOTICEABLE to others do you think your sleep problem is in terms of impairing the quality of your life? 0   How WORRIED/DISTRESSED are you about your current sleep problem? 0   To what extent do you consider your  "sleep problem to INTERFERE with your daily functioning (e.g. daytime fatigue, mood, ability to function at work/daily chores, concentration, memory, mood, etc.) CURRENTLY? 0   VLAD Total Score 0       Guidelines for Scoring/Interpretation:  Total score categories:  0-7 = No clinically significant insomnia   8-14 = Subthreshold insomnia   15-21 = Clinical insomnia (moderate severity)  22-28 = Clinical insomnia (severe)  Used via courtesy of www.[employer].va.gov with permission from Rakesh Spencer PhD., [facility]      STOP BANG     STOP BANG Questionnaire (  2008, the American Society of Anesthesiologists, Inc. [employer] 1/19/2023   B/P Clinic: -   [facility] Clinic: [facility]     2/8    GAD7    No flowsheet data found.      CAGE-AID    No flowsheet data found.    CAGE-AID reprinted with permission from the [place] Medical Journal, ZHANNA Sanchez and NASEEM Olivares, \"Conjoint screening questionnaires for alcohol and drug abuse\" [place] Medical Journal 94: 135-140, 1995.      PATIENT HEALTH QUESTIONNAIRE-9 (PHQ - 9)    No flowsheet data found.    Developed by Garrison Vela, Sadaf Chaves, Irwin Day and colleagues, with an educational eusebio from [employer] No permission required to reproduce, translate, display or distribute.        Allergies:    No Known Allergies    Medications:    Current Outpatient Medications   Medication Sig Dispense Refill     flecainide (TAMBOCOR) 50 MG tablet Take 1 tablet (50 mg) by mouth as needed (Take 50mg (1 tab) at onset of Atrial fibrillation episode, may repeat in 4 hours) 30 tablet 1     metoprolol tartrate (LOPRESSOR) 25 MG tablet Take 1 tablet (25 mg) by mouth as needed (take 25mg (1 tab) at onset of atrial fibrillation episode along with flecainide.  May repeat in 4 hours if needed.) 30 tablet 1     PFIZER-BIONT COVID-19 VAC-GLORY 30 MCG/0.3ML injection        rosuvastatin (CRESTOR) 20 MG tablet Take 20 mg by mouth daily         Problem " List:  Patient Active Problem List    Diagnosis Date Noted     Sleep-disordered breathing 01/19/2023     Priority: Medium     Snoring 12/02/2022     Priority: Medium     Paroxysmal atrial fibrillation (H) 12/01/2022     Priority: Medium     Dx Jun 2022  [id number] score = 0  CV 6/7/2022 ([facility])         Mitral valve prolapse 12/01/2022     Priority: Medium        Past Medical/Surgical History:  Past Medical History:   Diagnosis Date     Paroxysmal atrial fibrillation (H) 12/1/2022    Dx Jun 2022 [id number] score = 0      Snoring 12/2/2022     No past surgical history on file.    Social History:  Social History     Socioeconomic History     Marital status:      Spouse name: Not on file     Number of children: 2     Years of education: Not on file     Highest education level: Not on file   Occupational History     Occupation: Tech    Tobacco Use     Smoking status: Never     Smokeless tobacco: Never   Vaping Use     Vaping Use: Never used   Substance and Sexual Activity     Alcohol use: Yes     Comment: 10 drinks per week     Drug use: Never     Sexual activity: Yes     Partners: Female     Birth control/protection: Post-menopausal   Other Topics Concern     Not on file   Social History Narrative     Not on file     Social Determinants of Health     Financial Resource Strain: Not on file   Food Insecurity: Not on file   Transportation Needs: Not on file   Physical Activity: Not on file   Stress: Not on file   Social Connections: Not on file   Intimate Partner Violence: Not on file   Housing Stability: Not on file       Family History:  Family History   Problem Relation Age of Onset     Atrial fibrillation Mother      Dementia Mother      Pacemaker Father      Colon Cancer Brother      Atrial fibrillation Brother        Review of Systems:  A complete review of systems reviewed by me is negative with the exeption of what has been mentioned in the history of present illness.      Physical  "Examination:  Vitals: Ht 1.905 m (6' 3\")   Wt 79.4 kg (175 lb)   BMI 21.87 kg/m    BMI= Body mass index is 21.87 kg/m .       Physical Exam  Constitutional:       General: He is not in acute distress.     Appearance: Normal appearance. He is normal weight.   HENT:      Head: Normocephalic and atraumatic.      Right Ear: External ear normal.      Left Ear: External ear normal.      Nose: Nose normal.      Mouth/Throat:      Mouth: Mucous membranes are moist.      Dentition: Normal.   Eyes:      Conjunctiva/sclera: Conjunctivae normal.   Pulmonary:      Effort: Pulmonary effort is normal.   Musculoskeletal:      Cervical back: Normal range of motion and neck supple.   Neurological:      General: No focal deficit present.      Mental Status: He is alert and oriented to person, place, and time.   Psychiatric:         Mood and Affect: Mood normal.         Behavior: Behavior normal.         Thought Content: Thought content normal.         Judgment: Judgment normal.     Physical Exam   Constitutional: He appears healthy. No distress.   HENT:   Head: Normocephalic and atraumatic.   Right Ear: External ear normal.   Left Ear: External ear normal.   Nose: Nose normal.   Mouth/Throat: Mucous membranes are moist. Dentition is normal.   Eyes: Conjunctivae are normal.   Pulmonary/Chest: Effort normal.   Abdominal: Normal appearance.   Musculoskeletal:      Cervical back: Normal range of motion and neck supple.   Neurological: He is alert and oriented to person, place, and time.   Psychiatric: His behavior is normal. Mood, judgment and thought content normal.            Data: All pertinent previous laboratory data reviewed     Recent Labs   Lab Test 06/07/22  1314      POTASSIUM 4.2   CHLORIDE 108   CO2 26   ANIONGAP 6   GLC 95   BUN 24   CR 0.93   SCARLETT 8.7       Recent Labs   Lab Test 06/07/22  1314   WBC 6.2   RBC 5.70   HGB 16.7   HCT 50.6   MCV 89   MCH 29.3   MCHC 33.0   RDW 13.1          Recent Labs   Lab Test " 06/07/22  1314   PROTTOTAL 6.8   ALBUMIN 3.6   BILITOTAL 1.2   ALKPHOS 73   AST 26   ALT 28       TSH (mU/L)   Date Value   06/07/2022 1.16       No results found for: UAMP, UBARB, BENZODIAZEUR, UCANN, UCOC, OPIT, UPCP    No results found for: IRONSAT, [id number], ANA MARIA    No results found for: PH, PHARTERIAL, PO2, [id number], SAT, PCO2, HCO3, BASEEXCESS, BENJI, BEB    @LABRCNTIPR(phv:4,pco2v:4,po2v:4,hco3v:4,vidya:4,o2per:4)@    ECHO: (Taken from progress note from Karan Pritchard MD on 12/2/2022)  Dated: 6/24/2022  The left ventricle is normal in size.   Left ventricular systolic function is normal.   The visual ejection fraction is 55-60%.   No regional wall motion abnormalities noted.   Diastolic Doppler findings (E/E' ratio and/or other parameters) suggest left   ventricular filling pressures are normal.   The aortic valve is trileaflet with aortic valve sclerosis.   There is trace aortic regurgitation.   Sinus rhythm was noted.   There is no comparison study available.      Chest x-ray: No results found for this or any previous visit from the past 365 days.      Chest CT: No results found for this or any previous visit from the past 365 days.      PFT: Most Recent [facility]ze Pulmonary Function Testing    No results found for: 20001  No results found for: 20002  No results found for: 20003  No results found for: 20015  No results found for: 20016  No results found for: 20027  No results found for: 20028  No results found for: 20029  No results found for: 20079  No results found for: 20080  No results found for: 20081  No results found for: 20335  No results found for: 20105  No results found for: 20053  No results found for: 20054  No results found for: 20055      Zamzam Casper, KARI CNP 1/19/2023   Sleep Medicine    This note was written with the assistance of the Dragon voice-dictation technology software. The final document, although reviewed, may contain errors. For corrections, please contact the office.

## 2023-01-19 NOTE — PROGRESS NOTES
Asher is a 62 year old who is being evaluated via a billable video visit.      How would you like to obtain your AVS? MyChart  If the video visit is dropped, the invitation should be resent by: Send to e-mail at: [email or web address]  Will anyone else be joining your video visit? Isabelle Higgins      Video-Visit Details

## 2023-01-30 ENCOUNTER — DOCUMENTATION ONLY (OUTPATIENT)
Dept: CARDIOLOGY | Facility: CLINIC | Age: 63
End: 2023-01-30
Payer: COMMERCIAL

## 2023-01-30 ENCOUNTER — TELEPHONE (OUTPATIENT)
Dept: CARDIOLOGY | Facility: CLINIC | Age: 63
End: 2023-01-30
Payer: COMMERCIAL

## 2023-01-30 DIAGNOSIS — I48.0 PAROXYSMAL ATRIAL FIBRILLATION (H): Primary | ICD-10-CM

## 2023-01-30 NOTE — TELEPHONE ENCOUNTER
Pre PVI Medication Instructions    Reviewed via phone with pt    Pt has PVI scheduled on 2/7 with Dr Pritchard    Per PVI protocol and VORB from performing MD above pt is to:  Anticoagulation Instruction: Start eliquis 7 days prior prior to procedure per EP procedural MD, take anticoagulation uninterrupted through procedure, do not miss any doses of AC, importance of taking AC for stroke prevention, taking AC as prescribed, to call prior to PVI if missed a dose of AC, and if upon arrival pt reports missing a dose of AC PVI will potentially be cnx/postponed  AAD Instructions: None- N/A  PPI Instructions Start Protonix 40mg Daily 3 days prior, 6wk post, will be reviewed with pt at time of pre procedural OV    Pre-procedural letter was sent to pt via mail, discussed importance of medication changes above, pt was provided contact for further questions or concerns about above medication changes and Rx was sent in/medication list updated    1/30/2023 2:35 PM  Aminata Santana RN

## 2023-01-31 ENCOUNTER — LAB (OUTPATIENT)
Dept: CARDIOLOGY | Facility: CLINIC | Age: 63
End: 2023-01-31
Payer: COMMERCIAL

## 2023-01-31 ENCOUNTER — ALLIED HEALTH/NURSE VISIT (OUTPATIENT)
Dept: CARDIOLOGY | Facility: CLINIC | Age: 63
End: 2023-01-31
Payer: COMMERCIAL

## 2023-01-31 ENCOUNTER — OFFICE VISIT (OUTPATIENT)
Dept: CARDIOLOGY | Facility: CLINIC | Age: 63
End: 2023-01-31
Payer: COMMERCIAL

## 2023-01-31 ENCOUNTER — PREP FOR PROCEDURE (OUTPATIENT)
Dept: CARDIOLOGY | Facility: CLINIC | Age: 63
End: 2023-01-31

## 2023-01-31 VITALS
HEIGHT: 75 IN | DIASTOLIC BLOOD PRESSURE: 70 MMHG | WEIGHT: 181.8 LBS | BODY MASS INDEX: 22.6 KG/M2 | RESPIRATION RATE: 16 BRPM | SYSTOLIC BLOOD PRESSURE: 128 MMHG | HEART RATE: 53 BPM

## 2023-01-31 DIAGNOSIS — I48.0 PAROXYSMAL ATRIAL FIBRILLATION (H): Primary | ICD-10-CM

## 2023-01-31 DIAGNOSIS — G47.30 SLEEP-DISORDERED BREATHING: ICD-10-CM

## 2023-01-31 DIAGNOSIS — I48.0 PAROXYSMAL ATRIAL FIBRILLATION (H): ICD-10-CM

## 2023-01-31 LAB
ANION GAP SERPL CALCULATED.3IONS-SCNC: 9 MMOL/L (ref 7–15)
BUN SERPL-MCNC: 22.1 MG/DL (ref 8–23)
CALCIUM SERPL-MCNC: 9.3 MG/DL (ref 8.8–10.2)
CHLORIDE SERPL-SCNC: 106 MMOL/L (ref 98–107)
CREAT SERPL-MCNC: 0.84 MG/DL (ref 0.67–1.17)
DEPRECATED HCO3 PLAS-SCNC: 26 MMOL/L (ref 22–29)
ERYTHROCYTE [DISTWIDTH] IN BLOOD BY AUTOMATED COUNT: 12.8 % (ref 10–15)
GFR SERPL CREATININE-BSD FRML MDRD: >90 ML/MIN/1.73M2
GLUCOSE SERPL-MCNC: 82 MG/DL (ref 70–99)
HCT VFR BLD AUTO: 44.9 % (ref 40–53)
HGB BLD-MCNC: 14.9 G/DL (ref 13.3–17.7)
MCH RBC QN AUTO: 30 PG (ref 26.5–33)
MCHC RBC AUTO-ENTMCNC: 33.2 G/DL (ref 31.5–36.5)
MCV RBC AUTO: 90 FL (ref 78–100)
PLATELET # BLD AUTO: 196 10E3/UL (ref 150–450)
POTASSIUM SERPL-SCNC: 4.4 MMOL/L (ref 3.4–5.3)
RBC # BLD AUTO: 4.97 10E6/UL (ref 4.4–5.9)
SODIUM SERPL-SCNC: 141 MMOL/L (ref 136–145)
WBC # BLD AUTO: 6.1 10E3/UL (ref 4–11)

## 2023-01-31 PROCEDURE — 85027 COMPLETE CBC AUTOMATED: CPT

## 2023-01-31 PROCEDURE — 93000 ELECTROCARDIOGRAM COMPLETE: CPT | Performed by: INTERNAL MEDICINE

## 2023-01-31 PROCEDURE — 36415 COLL VENOUS BLD VENIPUNCTURE: CPT

## 2023-01-31 PROCEDURE — 80048 BASIC METABOLIC PNL TOTAL CA: CPT

## 2023-01-31 PROCEDURE — 99207 PR NO CHARGE NURSE ONLY: CPT

## 2023-01-31 PROCEDURE — 99214 OFFICE O/P EST MOD 30 MIN: CPT | Performed by: NURSE PRACTITIONER

## 2023-01-31 RX ORDER — SODIUM CHLORIDE 9 MG/ML
100 INJECTION, SOLUTION INTRAVENOUS CONTINUOUS
Status: CANCELLED | OUTPATIENT
Start: 2023-02-07

## 2023-01-31 RX ORDER — PANTOPRAZOLE SODIUM 40 MG/1
40 TABLET, DELAYED RELEASE ORAL DAILY
Qty: 45 TABLET | Refills: 0 | Status: SHIPPED | OUTPATIENT
Start: 2023-02-04 | End: 2023-03-21

## 2023-01-31 RX ORDER — FENTANYL CITRATE 50 UG/ML
25 INJECTION, SOLUTION INTRAMUSCULAR; INTRAVENOUS
Status: CANCELLED | OUTPATIENT
Start: 2023-02-07

## 2023-01-31 RX ORDER — LIDOCAINE HYDROCHLORIDE 20 MG/ML
5 JELLY TOPICAL
Status: CANCELLED | OUTPATIENT
Start: 2023-02-07

## 2023-01-31 RX ORDER — LIDOCAINE 40 MG/G
CREAM TOPICAL
Status: CANCELLED | OUTPATIENT
Start: 2023-01-31

## 2023-01-31 NOTE — PROGRESS NOTES
HEART CARE ELECTROPHYSIOLOGY NOTE      [facility]  [phone]      Assessment/Recommendations   Assessment/Plan:  1.  Paroxysmal Atrial Fibrillation:  Initially diagnosed June 2022.  Symptoms consist of palpitations, weakness, and shortness of breath.  He is scheduled for pulmonary vein isolation ablation with Dr. Pritchard on 2/7/2023.  We discussed ablation, <1% risk for complication, expected recovery, and follow-up.  He was instructed to discontinue flecainide 3 days prior to ablation.  Start pantoprazole 40 mg daily 3 days prior to ablation, to continue for 6 weeks after ablation.  He states that his questions were answered to his satisfaction and he is ready to proceed.    He has a [id number] score of 0.  HAS-BLED score of 0.   Eliquis started today; continue Eliquis 5 mg twice daily for stroke prophylaxis for at least 3 months post ablation.       2.  Possible sleep apnea: Sleep medicine consult reviewed.  In lab sleep study scheduled for 5/19/2023.  We reviewed the correlation between untreated sleep apnea and atrial arrhythmias.  Strongly recommend treatment if indicated.    Telephone follow up with EP RN on 2/10/2023  Follow-up with me on 3/21/2023, 6 weeks post PVI     History of Present Illness/Subjective    HPI: Asher Cross is a 62 year old male who comes in for EP follow up of atrial fibrillation and history and physical prior to pulmonary vein isolation ablation.  He has a history of atrial fibrillation and hyperlipidemia.    He was diagnosed with atrial fibrillation with rapid ventricular response in June 2022.  He underwent urgent cardioversion in the ED.  Symptoms consist of palpitations, weakness, and shortness of breath.  He had another episode in December that lasted all day and spontaneously converted overnight.  He was prescribed pill in the pocket flecainide.  Due to nocturnal onset of A. fib, evaluation for sleep apnea was recommended by Dr. Pritchard.  Sleep medicine  consult done 1/19/2023, in lab sleep study recommended, scheduled for 5/19/2023.    Asher states that he feels well.  He had an episode of A. fib about 3 weeks ago that terminated shortly after taking flecainide and metoprolol.  He presently denies chest discomfort, palpitations, abdominal fullness/bloating or peripheral edema, shortness of breath, paroxysmal nocturnal dyspnea, orthopnea, lightheadedness, dizziness, pre-syncope, or syncope.    Cardiographics (EKG personally reviewed):  EKG done 1/31/2023 shows sinus bradycardia 53 bpm, incomplete right bundle branch block,  ms, QT/QTc 416/390 ms.  EKG done 6/7/2022 shows atrial fibrillation with mildly elevated ventricular response at 98 bpm    Echo done 6/24/2022:  The left ventricle is normal in size.  Left ventricular systolic function is normal.  The visual ejection fraction is 55-60%.  No regional wall motion abnormalities noted.  Diastolic Doppler findings (E/E' ratio and/or other parameters) suggest left  ventricular filling pressures are normal.  The aortic valve is trileaflet with aortic valve sclerosis.  There is trace aortic regurgitation.  Sinus rhythm was noted.  There is no comparison study available.    Stress echo done 12/19/2022:  1. Normal stress echocardiogram without evidence of stress induced ischemia.  2. Normal resting LV systolic performance with an ejection fraction of 55-60%.  There is normal improvement in left ventricular systolic performance with a  peak ejection fraction of 70-75%.  3. No ECG evidence of ischemia.  4. No anginal chest pain reported with exercise.  5. Good functional capacity for age.  6. No atrial fibrillation or other rhythm disturbances were detected.    I have reviewed and updated the patient's Past Medical History, Social History, Family History and Medication List.  Outside records personally reviewed.     Physical Examination  Review of Systems   Vitals: /70 (BP Location: Right arm, Patient Position:  "Sitting, Cuff Size: Adult Regular)   Pulse 53   Resp 16   Ht 1.905 m (6' 3\")   Wt 82.5 kg (181 lb 12.8 oz)   BMI 22.72 kg/m    BMI= Body mass index is 22.72 kg/m .  Wt Readings from Last 3 Encounters:   01/31/23 82.5 kg (181 lb 12.8 oz)   01/19/23 79.4 kg (175 lb)   12/02/22 82.6 kg (182 lb)       General Appearance:   Alert, well-appearing and in no acute distress.   HEENT: Atraumatic, normocephalic.  No scleral icterus, normal conjunctivae, EOMs intact, PERRL.  Wearing a mask.   Chest/Lungs:   Chest symmetric, spine straight.  Respirations unlabored.  Lungs are clear to auscultation.   Cardiovascular:   Regular rate and rhythm.  Normal first and second heart sounds with no murmurs, rubs, or gallops; radial and posterior tibial pulses are intact, no edema.   Abdomen:  Soft, nondistended, bowel sounds present.   Extremities: No cyanosis or clubbing.   Musculoskeletal: Moves all extremities.    Skin: Warm, dry, intact.    Neurologic: Mood and affect are appropriate.  Alert and oriented to person, place, time, and situation.     ROS: 10 point ROS neg other than the symptoms noted above in the HPI.         Medical History  Surgical History Family History Social History   Past Medical History:   Diagnosis Date     Paroxysmal atrial fibrillation (H) 12/1/2022    Dx Jun 2022 [id number] score = 0      Snoring 12/2/2022     History reviewed. No pertinent surgical history.  Family History   Problem Relation Age of Onset     Atrial fibrillation Mother      Dementia Mother      Pacemaker Father      Colon Cancer Brother      Atrial fibrillation Brother         Social History     Socioeconomic History     Marital status:      Spouse name: Not on file     Number of children: 2     Years of education: Not on file     Highest education level: Not on file   Occupational History     Occupation: Tech    Tobacco Use     Smoking status: Never     Smokeless tobacco: Never   Vaping Use     Vaping Use: Never used "   Substance and Sexual Activity     Alcohol use: Yes     Comment: 10 drinks per week     Drug use: Never     Sexual activity: Yes     Partners: Female     Birth control/protection: Post-menopausal   Other Topics Concern     Not on file   Social History Narrative     Not on file     Social Determinants of Health     Financial Resource Strain: Not on file   Food Insecurity: Not on file   Transportation Needs: Not on file   Physical Activity: Not on file   Stress: Not on file   Social Connections: Not on file   Intimate Partner Violence: Not on file   Housing Stability: Not on file           Medications  Allergies   Current Outpatient Medications   Medication Sig Dispense Refill     apixaban ANTICOAGULANT (ELIQUIS ANTICOAGULANT) 5 MG tablet Take 1 tablet (5 mg) by mouth 2 times daily 180 tablet 1     [START ON 2/4/2023] pantoprazole (PROTONIX) 40 MG EC tablet Take 1 tablet (40 mg) by mouth daily Continue for 6 weeks after ablation 45 tablet 0     PFIZER-BIONT COVID-19 VAC-GLORY 30 MCG/0.3ML injection        rosuvastatin (CRESTOR) 20 MG tablet Take 20 mg by mouth daily       metoprolol tartrate (LOPRESSOR) 25 MG tablet Take 1 tablet (25 mg) by mouth as needed (take 25mg (1 tab) at onset of atrial fibrillation episode along with flecainide.  May repeat in 4 hours if needed.) (Patient not taking: Reported on 1/31/2023) 30 tablet 1     No Known Allergies       Lab Results    Chemistry/lipid CBC Cardiac Enzymes/BNP/TSH/INR   Recent Labs   Lab Test 01/31/23  1349 06/07/22  1314    140   POTASSIUM 4.4 4.2   CHLORIDE 106 108   CO2 26 26   ANIONGAP 9 6   GLC 82 95   BUN 22.1 24   CR 0.84 0.93   SCARLETT 9.3 8.7            CBC RESULTS: Recent Labs   Lab Test 01/31/23  1349   WBC 6.1   RBC 4.97   HGB 14.9   HCT 44.9   MCV 90   MCH 30.0   MCHC 33.2   RDW 12.8         No results for input(s): TROPONINI in the last 74069 hours.  No results for input(s): BNP, NTBNPI, NTBNP in the last 94301 hours.  Recent Labs   Lab Test  06/07/22  1314   TSH 1.16     No results for input(s): INR in the last 07754 hours.   39 minutes were spent on the date of encounter performing chart review, history and exam, documentation, and further activities as noted above.

## 2023-01-31 NOTE — PROCEDURES
Pre-Procedure Pulmonary Vein Ablation (AF) Education    Procedure: PVI with Dr Pritchard on 2/7 with arrival time 10:00 am    COVID: Pt denies COVID like symptoms, and is aware if he/she develops COVID like symptoms they would need to complete an at home with a rapid antigen COVID test 1-2 days prior to your procedure date. If COVID + pt is aware the procedure will need to be rescheduled, and to contact CV scheduling as soon as possible    Type & Screen: Is not required for PVI Ablation    Pre-Op H&P: Completed today with EP BEN- See record in Epic    Education:   Reviewed with pt in Clinic today  Pre-Procedure Instruction: NPO after midnight pre procedure, Defined NPO, Remove all jewelry and leave all valuables at home, Shower prior to arrival, Anesthesia and intubation plan/orders, Intra-procedure PVI process, Post- PVI procedure expectations/recovery, Transportation requirements and arrangements post procedure, Post-procedure follow up process, Letter sent to pt via [employer] and mail with written instructions (Refer to letters tab), Lab results would be called to pt if abnormal    Risks:   Pulmonary Vein/AF/Radiofrequency Ablation  In addition to standard risks for Radiofrequency Ablation, there is:    <2% for significant pulmonary vein stenosis    <2% risk for embolic events    <1% risk for esophageal fistula    <1% risk death      Pulmonary Vein Isolation / Cryoablation Risks:    1-2% risk for phrenic nerve paralysis    <1% risk for pulmonary vein stenosis    Risk of esophageal irritation with no incidence of atrial-esophageal fistula    Rare cryoballoon rupture    <1% risk death       Cardiac Catheter Ablation    <1% risk for the following: hypotension, hemorrhage, vascular injury including perforation of vein, artery or heart, thrombophlebitis, systemic or pulmonic emboli; cardiac perforation, (tamponade), infection, pneumothorax, arrhythmias, proarrhythmic effects of drugs, radiation exposure.    1-2% complete  heart block (for AVNRT or septol accessory pathway).    <0.5% CVA or MI    <0.1% death    If external defibrillation is needed, 75% risk for superficial burn.    1-2% tamponade and aortic puncture with left sided transeptal approach for left side LIT - increase risk of CVA to <2%.    Late arrhythmia recurrences depends upon the primary rhythm disturbance.    Medication:   Instructions regarding anticoagulants: Eliquis- Continue anticoagulation uninterrupted through their procedure, do not miss any doses of AC prior to procedure, importance of taking AC for stroke prevention, taking AC as prescribed, to call prior to PVI if missed a dose of AC, and if upon arrival pt reports missing a dose of AC PVI will potentially be cnx/postponed  Instructions given to pt regarding antiarrhythmic medication: None- N/A  Instructions given to pt regarding PPI medication: Start Protonix 40mg Daily 3 days prior, 6wk post  Instructions for medication, other than anticoagulants and antiarrhythmics listed above, given to pt: hold all medication AM of procedure     Important patient information for staff: None    1/31/2023 2:15 PM  Ce Adkins RN

## 2023-01-31 NOTE — LETTER
1/31/2023    [facility]  [address], [address]  [place] [address]    RE: Asher Cross       Dear Colleague,     I [facility] the [facility].    HEART CARE ELECTROPHYSIOLOGY NOTE      [facility]  [phone]      Assessment/Recommendations   Assessment/Plan:  1.  Paroxysmal Atrial Fibrillation:  Initially diagnosed June 2022.  Symptoms consist of palpitations, weakness, and shortness of breath.  He is scheduled for pulmonary vein isolation ablation with Dr. Pritchard on 2/7/2023.  We discussed ablation, <1% risk for complication, expected recovery, and follow-up.  He was instructed to discontinue flecainide 3 days prior to ablation.  Start pantoprazole 40 mg daily 3 days prior to ablation, to continue for 6 weeks after ablation.  He states that his questions were answered to his satisfaction and he is ready to proceed.    He has a [id number] score of 0.  HAS-BLED score of 0.   Eliquis started today; continue Eliquis 5 mg twice daily for stroke prophylaxis for at least 3 months post ablation.       2.  Possible sleep apnea: Sleep medicine consult reviewed.  In lab sleep study scheduled for 5/19/2023.  We reviewed the correlation between untreated sleep apnea and atrial arrhythmias.  Strongly recommend treatment if indicated.    Telephone follow up with EP RN on 2/10/2023  Follow-up with me on 3/21/2023, 6 weeks post PVI     History of Present Illness/Subjective    HPI: Asher Cross is a 62 year old male who comes in for EP follow up of atrial fibrillation and history and physical prior to pulmonary vein isolation ablation.  He has a history of atrial fibrillation and hyperlipidemia.    He was diagnosed with atrial fibrillation with rapid ventricular response in June 2022.  He underwent urgent cardioversion in the ED.  Symptoms consist of palpitations, weakness, and shortness of breath.  He had another episode in  December that lasted all day and spontaneously converted overnight.  He was prescribed pill in the pocket flecainide.  Due to nocturnal onset of A. fib, evaluation for sleep apnea was recommended by Dr. Pritchard.  Sleep medicine consult done 1/19/2023, in lab sleep study recommended, scheduled for 5/19/2023.    Asher states that he feels well.  He had an episode of A. fib about 3 weeks ago that terminated shortly after taking flecainide and metoprolol.  He presently denies chest discomfort, palpitations, abdominal fullness/bloating or peripheral edema, shortness of breath, paroxysmal nocturnal dyspnea, orthopnea, lightheadedness, dizziness, pre-syncope, or syncope.    Cardiographics (EKG personally reviewed):  EKG done 1/31/2023 shows sinus bradycardia 53 bpm, incomplete right bundle branch block,  ms, QT/QTc 416/390 ms.  EKG done 6/7/2022 shows atrial fibrillation with mildly elevated ventricular response at 98 bpm    Echo done 6/24/2022:  The left ventricle is normal in size.  Left ventricular systolic function is normal.  The visual ejection fraction is 55-60%.  No regional wall motion abnormalities noted.  Diastolic Doppler findings (E/E' ratio and/or other parameters) suggest left  ventricular filling pressures are normal.  The aortic valve is trileaflet with aortic valve sclerosis.  There is trace aortic regurgitation.  Sinus rhythm was noted.  There is no comparison study available.    Stress echo done 12/19/2022:  1. Normal stress echocardiogram without evidence of stress induced ischemia.  2. Normal resting LV systolic performance with an ejection fraction of 55-60%.  There is normal improvement in left ventricular systolic performance with a  peak ejection fraction of 70-75%.  3. No ECG evidence of ischemia.  4. No anginal chest pain reported with exercise.  5. Good functional capacity for age.  6. No atrial fibrillation or other rhythm disturbances were detected.    I have reviewed and updated the  "patient's Past Medical History, Social History, Family History and Medication List.  Outside records personally reviewed.     Physical Examination  Review of Systems   Vitals: /70 (BP Location: Right arm, Patient Position: Sitting, Cuff Size: Adult Regular)   Pulse 53   Resp 16   Ht 1.905 m (6' 3\")   Wt 82.5 kg (181 lb 12.8 oz)   BMI 22.72 kg/m    BMI= Body mass index is 22.72 kg/m .  Wt Readings from Last 3 Encounters:   01/31/23 82.5 kg (181 lb 12.8 oz)   01/19/23 79.4 kg (175 lb)   12/02/22 82.6 kg (182 lb)       General Appearance:   Alert, well-appearing and in no acute distress.   HEENT: Atraumatic, normocephalic.  No scleral icterus, normal conjunctivae, EOMs intact, PERRL.  Wearing a mask.   Chest/Lungs:   Chest symmetric, spine straight.  Respirations unlabored.  Lungs are clear to auscultation.   Cardiovascular:   Regular rate and rhythm.  Normal first and second heart sounds with no murmurs, rubs, or gallops; radial and posterior tibial pulses are intact, no edema.   Abdomen:  Soft, nondistended, bowel sounds present.   Extremities: No cyanosis or clubbing.   Musculoskeletal: Moves all extremities.    Skin: Warm, dry, intact.    Neurologic: Mood and affect are appropriate.  Alert and oriented to person, place, time, and situation.     ROS: 10 point ROS neg other than the symptoms noted above in the HPI.         Medical History  Surgical History Family History Social History   Past Medical History:   Diagnosis Date     Paroxysmal atrial fibrillation (H) 12/1/2022    Dx Jun 2022 [id number] score = 0      Snoring 12/2/2022     History reviewed. No pertinent surgical history.  Family History   Problem Relation Age of Onset     Atrial fibrillation Mother      Dementia Mother      Pacemaker Father      Colon Cancer Brother      Atrial fibrillation Brother         Social History     Socioeconomic History     Marital status:      Spouse name: Not on file     Number of children: 2     Years of " education: Not on file     Highest education level: Not on file   Occupational History     Occupation: Tech    Tobacco Use     Smoking status: Never     Smokeless tobacco: Never   Vaping Use     Vaping Use: Never used   Substance and Sexual Activity     Alcohol use: Yes     Comment: 10 drinks per week     Drug use: Never     Sexual activity: Yes     Partners: Female     Birth control/protection: Post-menopausal   Other Topics Concern     Not on file   Social History Narrative     Not on file     Social Determinants of Health     Financial Resource Strain: Not on file   Food Insecurity: Not on file   Transportation Needs: Not on file   Physical Activity: Not on file   Stress: Not on file   Social Connections: Not on file   Intimate Partner Violence: Not on file   Housing Stability: Not on file           Medications  Allergies   Current Outpatient Medications   Medication Sig Dispense Refill     apixaban ANTICOAGULANT (ELIQUIS ANTICOAGULANT) 5 MG tablet Take 1 tablet (5 mg) by mouth 2 times daily 180 tablet 1     [START ON 2/4/2023] pantoprazole (PROTONIX) 40 MG EC tablet Take 1 tablet (40 mg) by mouth daily Continue for 6 weeks after ablation 45 tablet 0     PFIZER-BIONT COVID-19 VAC-GLORY 30 MCG/0.3ML injection        rosuvastatin (CRESTOR) 20 MG tablet Take 20 mg by mouth daily       metoprolol tartrate (LOPRESSOR) 25 MG tablet Take 1 tablet (25 mg) by mouth as needed (take 25mg (1 tab) at onset of atrial fibrillation episode along with flecainide.  May repeat in 4 hours if needed.) (Patient not taking: Reported on 1/31/2023) 30 tablet 1     No Known Allergies       Lab Results    Chemistry/lipid CBC Cardiac Enzymes/BNP/TSH/INR   Recent Labs   Lab Test 01/31/23  1349 06/07/22  1314    140   POTASSIUM 4.4 4.2   CHLORIDE 106 108   CO2 26 26   ANIONGAP 9 6   GLC 82 95   BUN 22.1 24   CR 0.84 0.93   SCARLETT 9.3 8.7            CBC RESULTS: Recent Labs   Lab Test 01/31/23  1349   WBC 6.1   RBC 4.97   HGB 14.9    HCT 44.9   MCV 90   MCH 30.0   MCHC 33.2   RDW 12.8         No results for input(s): TROPONINI in the last 96512 hours.  No results for input(s): BNP, NTBNPI, NTBNP in the last 48000 hours.  Recent Labs   Lab Test 06/07/22  1314   TSH 1.16     No results for input(s): INR in the last 16161 hours.   39 minutes were spent on the date of encounter performing chart review, history and exam, documentation, and further activities as noted above.            Thank you for allowing me to participate in the care of your patient.      Sincerely,     KARI Roach [facility]  cc:   No referring provider defined for this encounter.

## 2023-01-31 NOTE — PATIENT INSTRUCTIONS
Asher Cross,    It was a pleasure to see you today at the [facility].     My recommendations after this visit include:    You are scheduled for pulmonary vein isolation ablation with Dr. Pritchard on 2/7/2023    Continue Eliquis 5 mg every 12 hours to decrease stroke risk, to continue for at least 3 months following ablation.  Take Eliquis the morning of your ablation.  Do not take any other medications the morning of ablation.    Do not take any flecainide after 2/4/2023  On 2/4/2023, start pantoprazole 40 mg daily, to continue for 6 weeks after ablation    Telephone follow-up with EP RN on 2/10/2023  Follow up with me on 3/21/2023    Hali Osei, CNP  [facility], Electrophysiology  [phone]  EP nurses [phone]

## 2023-01-31 NOTE — H&P (VIEW-ONLY)
HEART CARE ELECTROPHYSIOLOGY NOTE      [facility]  [phone]      Assessment/Recommendations   Assessment/Plan:  1.  Paroxysmal Atrial Fibrillation:  Initially diagnosed June 2022.  Symptoms consist of palpitations, weakness, and shortness of breath.  He is scheduled for pulmonary vein isolation ablation with Dr. Pritchard on 2/7/2023.  We discussed ablation, <1% risk for complication, expected recovery, and follow-up.  He was instructed to discontinue flecainide 3 days prior to ablation.  Start pantoprazole 40 mg daily 3 days prior to ablation, to continue for 6 weeks after ablation.  He states that his questions were answered to his satisfaction and he is ready to proceed.    He has a [id number] score of 0.  HAS-BLED score of 0.   Eliquis started today; continue Eliquis 5 mg twice daily for stroke prophylaxis for at least 3 months post ablation.       2.  Possible sleep apnea: Sleep medicine consult reviewed.  In lab sleep study scheduled for 5/19/2023.  We reviewed the correlation between untreated sleep apnea and atrial arrhythmias.  Strongly recommend treatment if indicated.    Telephone follow up with EP RN on 2/10/2023  Follow-up with me on 3/21/2023, 6 weeks post PVI     History of Present Illness/Subjective    HPI: Asher Cross is a 62 year old male who comes in for EP follow up of atrial fibrillation and history and physical prior to pulmonary vein isolation ablation.  He has a history of atrial fibrillation and hyperlipidemia.    He was diagnosed with atrial fibrillation with rapid ventricular response in June 2022.  He underwent urgent cardioversion in the ED.  Symptoms consist of palpitations, weakness, and shortness of breath.  He had another episode in December that lasted all day and spontaneously converted overnight.  He was prescribed pill in the pocket flecainide.  Due to nocturnal onset of A. fib, evaluation for sleep apnea was recommended by Dr. Pritchard.  Sleep medicine  consult done 1/19/2023, in lab sleep study recommended, scheduled for 5/19/2023.    Asher states that he feels well.  He had an episode of A. fib about 3 weeks ago that terminated shortly after taking flecainide and metoprolol.  He presently denies chest discomfort, palpitations, abdominal fullness/bloating or peripheral edema, shortness of breath, paroxysmal nocturnal dyspnea, orthopnea, lightheadedness, dizziness, pre-syncope, or syncope.    Cardiographics (EKG personally reviewed):  EKG done 1/31/2023 shows sinus bradycardia 53 bpm, incomplete right bundle branch block,  ms, QT/QTc 416/390 ms.  EKG done 6/7/2022 shows atrial fibrillation with mildly elevated ventricular response at 98 bpm    Echo done 6/24/2022:  The left ventricle is normal in size.  Left ventricular systolic function is normal.  The visual ejection fraction is 55-60%.  No regional wall motion abnormalities noted.  Diastolic Doppler findings (E/E' ratio and/or other parameters) suggest left  ventricular filling pressures are normal.  The aortic valve is trileaflet with aortic valve sclerosis.  There is trace aortic regurgitation.  Sinus rhythm was noted.  There is no comparison study available.    Stress echo done 12/19/2022:  1. Normal stress echocardiogram without evidence of stress induced ischemia.  2. Normal resting LV systolic performance with an ejection fraction of 55-60%.  There is normal improvement in left ventricular systolic performance with a  peak ejection fraction of 70-75%.  3. No ECG evidence of ischemia.  4. No anginal chest pain reported with exercise.  5. Good functional capacity for age.  6. No atrial fibrillation or other rhythm disturbances were detected.    I have reviewed and updated the patient's Past Medical History, Social History, Family History and Medication List.  Outside records personally reviewed.     Physical Examination  Review of Systems   Vitals: /70 (BP Location: Right arm, Patient Position:  "Sitting, Cuff Size: Adult Regular)   Pulse 53   Resp 16   Ht 1.905 m (6' 3\")   Wt 82.5 kg (181 lb 12.8 oz)   BMI 22.72 kg/m    BMI= Body mass index is 22.72 kg/m .  Wt Readings from Last 3 Encounters:   01/31/23 82.5 kg (181 lb 12.8 oz)   01/19/23 79.4 kg (175 lb)   12/02/22 82.6 kg (182 lb)       General Appearance:   Alert, well-appearing and in no acute distress.   HEENT: Atraumatic, normocephalic.  No scleral icterus, normal conjunctivae, EOMs intact, PERRL.  Wearing a mask.   Chest/Lungs:   Chest symmetric, spine straight.  Respirations unlabored.  Lungs are clear to auscultation.   Cardiovascular:   Regular rate and rhythm.  Normal first and second heart sounds with no murmurs, rubs, or gallops; radial and posterior tibial pulses are intact, no edema.   Abdomen:  Soft, nondistended, bowel sounds present.   Extremities: No cyanosis or clubbing.   Musculoskeletal: Moves all extremities.    Skin: Warm, dry, intact.    Neurologic: Mood and affect are appropriate.  Alert and oriented to person, place, time, and situation.     ROS: 10 point ROS neg other than the symptoms noted above in the HPI.         Medical History  Surgical History Family History Social History   Past Medical History:   Diagnosis Date     Paroxysmal atrial fibrillation (H) 12/1/2022    Dx Jun 2022 [id number] score = 0      Snoring 12/2/2022     History reviewed. No pertinent surgical history.  Family History   Problem Relation Age of Onset     Atrial fibrillation Mother      Dementia Mother      Pacemaker Father      Colon Cancer Brother      Atrial fibrillation Brother         Social History     Socioeconomic History     Marital status:      Spouse name: Not on file     Number of children: 2     Years of education: Not on file     Highest education level: Not on file   Occupational History     Occupation: Tech    Tobacco Use     Smoking status: Never     Smokeless tobacco: Never   Vaping Use     Vaping Use: Never used "   Substance and Sexual Activity     Alcohol use: Yes     Comment: 10 drinks per week     Drug use: Never     Sexual activity: Yes     Partners: Female     Birth control/protection: Post-menopausal   Other Topics Concern     Not on file   Social History Narrative     Not on file     Social Determinants of Health     Financial Resource Strain: Not on file   Food Insecurity: Not on file   Transportation Needs: Not on file   Physical Activity: Not on file   Stress: Not on file   Social Connections: Not on file   Intimate Partner Violence: Not on file   Housing Stability: Not on file           Medications  Allergies   Current Outpatient Medications   Medication Sig Dispense Refill     apixaban ANTICOAGULANT (ELIQUIS ANTICOAGULANT) 5 MG tablet Take 1 tablet (5 mg) by mouth 2 times daily 180 tablet 1     [START ON 2/4/2023] pantoprazole (PROTONIX) 40 MG EC tablet Take 1 tablet (40 mg) by mouth daily Continue for 6 weeks after ablation 45 tablet 0     PFIZER-BIONT COVID-19 VAC-GLORY 30 MCG/0.3ML injection        rosuvastatin (CRESTOR) 20 MG tablet Take 20 mg by mouth daily       metoprolol tartrate (LOPRESSOR) 25 MG tablet Take 1 tablet (25 mg) by mouth as needed (take 25mg (1 tab) at onset of atrial fibrillation episode along with flecainide.  May repeat in 4 hours if needed.) (Patient not taking: Reported on 1/31/2023) 30 tablet 1     No Known Allergies       Lab Results    Chemistry/lipid CBC Cardiac Enzymes/BNP/TSH/INR   Recent Labs   Lab Test 01/31/23  1349 06/07/22  1314    140   POTASSIUM 4.4 4.2   CHLORIDE 106 108   CO2 26 26   ANIONGAP 9 6   GLC 82 95   BUN 22.1 24   CR 0.84 0.93   SCARLETT 9.3 8.7            CBC RESULTS: Recent Labs   Lab Test 01/31/23  1349   WBC 6.1   RBC 4.97   HGB 14.9   HCT 44.9   MCV 90   MCH 30.0   MCHC 33.2   RDW 12.8         No results for input(s): TROPONINI in the last 38734 hours.  No results for input(s): BNP, NTBNPI, NTBNP in the last 13883 hours.  Recent Labs   Lab Test  06/07/22  1314   TSH 1.16     No results for input(s): INR in the last 82714 hours.   39 minutes were spent on the date of encounter performing chart review, history and exam, documentation, and further activities as noted above.

## 2023-02-01 LAB
ATRIAL RATE - MUSE: 53 BPM
DIASTOLIC BLOOD PRESSURE - MUSE: NORMAL MMHG
INTERPRETATION ECG - MUSE: NORMAL
P AXIS - MUSE: 68 DEGREES
PR INTERVAL - MUSE: 164 MS
QRS DURATION - MUSE: 104 MS
QT - MUSE: 416 MS
QTC - MUSE: 390 MS
R AXIS - MUSE: 79 DEGREES
SYSTOLIC BLOOD PRESSURE - MUSE: NORMAL MMHG
T AXIS - MUSE: 20 DEGREES
VENTRICULAR RATE- MUSE: 53 BPM

## 2023-02-07 ENCOUNTER — ANESTHESIA (OUTPATIENT)
Dept: CARDIOLOGY | Facility: HOSPITAL | Age: 63
End: 2023-02-07
Payer: COMMERCIAL

## 2023-02-07 ENCOUNTER — ANESTHESIA EVENT (OUTPATIENT)
Dept: CARDIOLOGY | Facility: HOSPITAL | Age: 63
End: 2023-02-07
Payer: COMMERCIAL

## 2023-02-07 ENCOUNTER — HOSPITAL ENCOUNTER (OUTPATIENT)
Facility: HOSPITAL | Age: 63
Discharge: HOME | End: 2023-02-07
Attending: INTERNAL MEDICINE | Admitting: INTERNAL MEDICINE
Payer: COMMERCIAL

## 2023-02-07 VITALS
HEART RATE: 74 BPM | SYSTOLIC BLOOD PRESSURE: 114 MMHG | RESPIRATION RATE: 16 BRPM | WEIGHT: 181 LBS | HEIGHT: 75 IN | BODY MASS INDEX: 22.5 KG/M2 | OXYGEN SATURATION: 95 % | TEMPERATURE: 98.5 F | DIASTOLIC BLOOD PRESSURE: 62 MMHG

## 2023-02-07 DIAGNOSIS — I48.0 PAROXYSMAL ATRIAL FIBRILLATION (H): ICD-10-CM

## 2023-02-07 LAB
ACT BLD: 297 SECONDS (ref 74–150)
ACT BLD: 356 SECONDS (ref 74–150)
ACT BLD: 369 SECONDS (ref 74–150)
ACT BLD: 378 SECONDS (ref 74–150)
ACT BLD: 403 SECONDS (ref 74–150)
ACT BLD: 420 SECONDS (ref 74–150)
ANION GAP SERPL CALCULATED.3IONS-SCNC: 7 MMOL/L (ref 7–15)
BUN SERPL-MCNC: 22 MG/DL (ref 8–23)
CALCIUM SERPL-MCNC: 9 MG/DL (ref 8.8–10.2)
CHLORIDE SERPL-SCNC: 107 MMOL/L (ref 98–107)
CREAT SERPL-MCNC: 0.87 MG/DL (ref 0.67–1.17)
DEPRECATED HCO3 PLAS-SCNC: 26 MMOL/L (ref 22–29)
ERYTHROCYTE [DISTWIDTH] IN BLOOD BY AUTOMATED COUNT: 12.8 % (ref 10–15)
GFR SERPL CREATININE-BSD FRML MDRD: >90 ML/MIN/1.73M2
GLUCOSE SERPL-MCNC: 91 MG/DL (ref 70–99)
HCT VFR BLD AUTO: 43.8 % (ref 40–53)
HGB BLD-MCNC: 14.3 G/DL (ref 13.3–17.7)
MCH RBC QN AUTO: 29.4 PG (ref 26.5–33)
MCHC RBC AUTO-ENTMCNC: 32.6 G/DL (ref 31.5–36.5)
MCV RBC AUTO: 90 FL (ref 78–100)
PLATELET # BLD AUTO: 165 10E3/UL (ref 150–450)
POTASSIUM SERPL-SCNC: 4.5 MMOL/L (ref 3.4–5.3)
RBC # BLD AUTO: 4.87 10E6/UL (ref 4.4–5.9)
SODIUM SERPL-SCNC: 140 MMOL/L (ref 136–145)
WBC # BLD AUTO: 4.7 10E3/UL (ref 4–11)

## 2023-02-07 PROCEDURE — 36415 COLL VENOUS BLD VENIPUNCTURE: CPT | Performed by: INTERNAL MEDICINE

## 2023-02-07 PROCEDURE — C1766 INTRO/SHEATH,STRBLE,NON-PEEL: HCPCS | Performed by: INTERNAL MEDICINE

## 2023-02-07 PROCEDURE — C1769 GUIDE WIRE: HCPCS | Performed by: INTERNAL MEDICINE

## 2023-02-07 PROCEDURE — 258N000003 HC RX IP 258 OP 636: Performed by: INTERNAL MEDICINE

## 2023-02-07 PROCEDURE — 272N000001 HC OR GENERAL SUPPLY STERILE: Performed by: INTERNAL MEDICINE

## 2023-02-07 PROCEDURE — 85347 COAGULATION TIME ACTIVATED: CPT | Mod: 91

## 2023-02-07 PROCEDURE — 250N000011 HC RX IP 250 OP 636

## 2023-02-07 PROCEDURE — 258N000003 HC RX IP 258 OP 636: Performed by: ANESTHESIOLOGY

## 2023-02-07 PROCEDURE — C1730 CATH, EP, 19 OR FEW ELECT: HCPCS | Performed by: INTERNAL MEDICINE

## 2023-02-07 PROCEDURE — C1759 CATH, INTRA ECHOCARDIOGRAPHY: HCPCS | Performed by: INTERNAL MEDICINE

## 2023-02-07 PROCEDURE — 250N000011 HC RX IP 250 OP 636: Performed by: ANESTHESIOLOGY

## 2023-02-07 PROCEDURE — 710N000010 HC RECOVERY PHASE 1, LEVEL 2, PER MIN

## 2023-02-07 PROCEDURE — 250N000011 HC RX IP 250 OP 636: Performed by: INTERNAL MEDICINE

## 2023-02-07 PROCEDURE — 250N000009 HC RX 250: Performed by: ANESTHESIOLOGY

## 2023-02-07 PROCEDURE — C1733 CATH, EP, OTHR THAN COOL-TIP: HCPCS | Performed by: INTERNAL MEDICINE

## 2023-02-07 PROCEDURE — C1887 CATHETER, GUIDING: HCPCS | Performed by: INTERNAL MEDICINE

## 2023-02-07 PROCEDURE — 93656 COMPRE EP EVAL ABLTJ ATR FIB: CPT | Performed by: INTERNAL MEDICINE

## 2023-02-07 PROCEDURE — C1732 CATH, EP, DIAG/ABL, 3D/VECT: HCPCS | Performed by: INTERNAL MEDICINE

## 2023-02-07 PROCEDURE — C1894 INTRO/SHEATH, NON-LASER: HCPCS | Performed by: INTERNAL MEDICINE

## 2023-02-07 PROCEDURE — 370N000017 HC ANESTHESIA TECHNICAL FEE, PER MIN: Performed by: INTERNAL MEDICINE

## 2023-02-07 PROCEDURE — 80048 BASIC METABOLIC PNL TOTAL CA: CPT | Performed by: INTERNAL MEDICINE

## 2023-02-07 PROCEDURE — 250N000009 HC RX 250: Performed by: NURSE ANESTHETIST, CERTIFIED REGISTERED

## 2023-02-07 PROCEDURE — 250N000011 HC RX IP 250 OP 636: Performed by: NURSE ANESTHETIST, CERTIFIED REGISTERED

## 2023-02-07 PROCEDURE — 85027 COMPLETE CBC AUTOMATED: CPT | Performed by: INTERNAL MEDICINE

## 2023-02-07 RX ORDER — OXYCODONE HYDROCHLORIDE 5 MG/1
5 TABLET ORAL EVERY 4 HOURS PRN
Status: DISCONTINUED | OUTPATIENT
Start: 2023-02-07 | End: 2023-02-07 | Stop reason: HOSPADM

## 2023-02-07 RX ORDER — NALOXONE HYDROCHLORIDE 0.4 MG/ML
0.2 INJECTION, SOLUTION INTRAMUSCULAR; INTRAVENOUS; SUBCUTANEOUS
Status: DISCONTINUED | OUTPATIENT
Start: 2023-02-07 | End: 2023-02-07 | Stop reason: HOSPADM

## 2023-02-07 RX ORDER — FENTANYL CITRATE 50 UG/ML
50 INJECTION, SOLUTION INTRAMUSCULAR; INTRAVENOUS EVERY 5 MIN PRN
Status: DISCONTINUED | OUTPATIENT
Start: 2023-02-07 | End: 2023-02-07 | Stop reason: HOSPADM

## 2023-02-07 RX ORDER — HEPARIN SODIUM 1000 [USP'U]/ML
INJECTION, SOLUTION INTRAVENOUS; SUBCUTANEOUS
Status: DISCONTINUED | OUTPATIENT
Start: 2023-02-07 | End: 2023-02-07 | Stop reason: HOSPADM

## 2023-02-07 RX ORDER — PROTAMINE SULFATE 10 MG/ML
INJECTION, SOLUTION INTRAVENOUS PRN
Status: DISCONTINUED | OUTPATIENT
Start: 2023-02-07 | End: 2023-02-07

## 2023-02-07 RX ORDER — LIDOCAINE 40 MG/G
CREAM TOPICAL
Status: DISCONTINUED | OUTPATIENT
Start: 2023-02-07 | End: 2023-02-07 | Stop reason: HOSPADM

## 2023-02-07 RX ORDER — NALOXONE HYDROCHLORIDE 0.4 MG/ML
0.4 INJECTION, SOLUTION INTRAMUSCULAR; INTRAVENOUS; SUBCUTANEOUS
Status: DISCONTINUED | OUTPATIENT
Start: 2023-02-07 | End: 2023-02-07 | Stop reason: HOSPADM

## 2023-02-07 RX ORDER — ONDANSETRON 2 MG/ML
INJECTION INTRAMUSCULAR; INTRAVENOUS PRN
Status: DISCONTINUED | OUTPATIENT
Start: 2023-02-07 | End: 2023-02-07

## 2023-02-07 RX ORDER — HALOPERIDOL 5 MG/ML
1 INJECTION INTRAMUSCULAR
Status: DISCONTINUED | OUTPATIENT
Start: 2023-02-07 | End: 2023-02-07 | Stop reason: HOSPADM

## 2023-02-07 RX ORDER — FENTANYL CITRATE 50 UG/ML
INJECTION, SOLUTION INTRAMUSCULAR; INTRAVENOUS PRN
Status: DISCONTINUED | OUTPATIENT
Start: 2023-02-07 | End: 2023-02-07

## 2023-02-07 RX ORDER — ONDANSETRON 2 MG/ML
4 INJECTION INTRAMUSCULAR; INTRAVENOUS EVERY 30 MIN PRN
Status: DISCONTINUED | OUTPATIENT
Start: 2023-02-07 | End: 2023-02-07 | Stop reason: HOSPADM

## 2023-02-07 RX ORDER — ACETAMINOPHEN 325 MG/1
650 TABLET ORAL EVERY 4 HOURS PRN
Status: DISCONTINUED | OUTPATIENT
Start: 2023-02-07 | End: 2023-02-07 | Stop reason: HOSPADM

## 2023-02-07 RX ORDER — OXYCODONE HYDROCHLORIDE 5 MG/1
10 TABLET ORAL EVERY 4 HOURS PRN
Status: DISCONTINUED | OUTPATIENT
Start: 2023-02-07 | End: 2023-02-07 | Stop reason: HOSPADM

## 2023-02-07 RX ORDER — FENTANYL CITRATE 50 UG/ML
25 INJECTION, SOLUTION INTRAMUSCULAR; INTRAVENOUS
Status: DISCONTINUED | OUTPATIENT
Start: 2023-02-07 | End: 2023-02-07 | Stop reason: HOSPADM

## 2023-02-07 RX ORDER — HEPARIN SODIUM 10000 [USP'U]/100ML
INJECTION, SOLUTION INTRAVENOUS CONTINUOUS PRN
Status: DISCONTINUED | OUTPATIENT
Start: 2023-02-07 | End: 2023-02-07 | Stop reason: HOSPADM

## 2023-02-07 RX ORDER — ONDANSETRON 4 MG/1
4 TABLET, ORALLY DISINTEGRATING ORAL EVERY 6 HOURS PRN
Status: DISCONTINUED | OUTPATIENT
Start: 2023-02-07 | End: 2023-02-07 | Stop reason: HOSPADM

## 2023-02-07 RX ORDER — PROPOFOL 10 MG/ML
INJECTION, EMULSION INTRAVENOUS PRN
Status: DISCONTINUED | OUTPATIENT
Start: 2023-02-07 | End: 2023-02-07

## 2023-02-07 RX ORDER — SODIUM CHLORIDE, SODIUM LACTATE, POTASSIUM CHLORIDE, CALCIUM CHLORIDE 600; 310; 30; 20 MG/100ML; MG/100ML; MG/100ML; MG/100ML
INJECTION, SOLUTION INTRAVENOUS CONTINUOUS
Status: DISCONTINUED | OUTPATIENT
Start: 2023-02-07 | End: 2023-02-07 | Stop reason: HOSPADM

## 2023-02-07 RX ORDER — HYDROMORPHONE HYDROCHLORIDE 1 MG/ML
0.4 INJECTION, SOLUTION INTRAMUSCULAR; INTRAVENOUS; SUBCUTANEOUS EVERY 5 MIN PRN
Status: DISCONTINUED | OUTPATIENT
Start: 2023-02-07 | End: 2023-02-07 | Stop reason: HOSPADM

## 2023-02-07 RX ORDER — SODIUM CHLORIDE 9 MG/ML
100 INJECTION, SOLUTION INTRAVENOUS CONTINUOUS
Status: DISCONTINUED | OUTPATIENT
Start: 2023-02-07 | End: 2023-02-07 | Stop reason: HOSPADM

## 2023-02-07 RX ORDER — ONDANSETRON 2 MG/ML
4 INJECTION INTRAMUSCULAR; INTRAVENOUS EVERY 6 HOURS PRN
Status: DISCONTINUED | OUTPATIENT
Start: 2023-02-07 | End: 2023-02-07 | Stop reason: HOSPADM

## 2023-02-07 RX ORDER — LIDOCAINE HYDROCHLORIDE 20 MG/ML
5 JELLY TOPICAL
Status: DISCONTINUED | OUTPATIENT
Start: 2023-02-07 | End: 2023-02-07 | Stop reason: HOSPADM

## 2023-02-07 RX ORDER — FENTANYL CITRATE 50 UG/ML
25 INJECTION, SOLUTION INTRAMUSCULAR; INTRAVENOUS EVERY 5 MIN PRN
Status: DISCONTINUED | OUTPATIENT
Start: 2023-02-07 | End: 2023-02-07 | Stop reason: HOSPADM

## 2023-02-07 RX ORDER — MEPERIDINE HYDROCHLORIDE 25 MG/ML
12.5 INJECTION INTRAMUSCULAR; INTRAVENOUS; SUBCUTANEOUS EVERY 5 MIN PRN
Status: DISCONTINUED | OUTPATIENT
Start: 2023-02-07 | End: 2023-02-07 | Stop reason: HOSPADM

## 2023-02-07 RX ORDER — LIDOCAINE HYDROCHLORIDE 10 MG/ML
INJECTION, SOLUTION INFILTRATION; PERINEURAL PRN
Status: DISCONTINUED | OUTPATIENT
Start: 2023-02-07 | End: 2023-02-07

## 2023-02-07 RX ORDER — DEXAMETHASONE SODIUM PHOSPHATE 10 MG/ML
INJECTION, SOLUTION INTRAMUSCULAR; INTRAVENOUS PRN
Status: DISCONTINUED | OUTPATIENT
Start: 2023-02-07 | End: 2023-02-07

## 2023-02-07 RX ORDER — IBUPROFEN 600 MG/1
600 TABLET, FILM COATED ORAL EVERY 6 HOURS PRN
Status: DISCONTINUED | OUTPATIENT
Start: 2023-02-07 | End: 2023-02-07 | Stop reason: HOSPADM

## 2023-02-07 RX ORDER — GLYCOPYRROLATE 0.2 MG/ML
INJECTION, SOLUTION INTRAMUSCULAR; INTRAVENOUS PRN
Status: DISCONTINUED | OUTPATIENT
Start: 2023-02-07 | End: 2023-02-07

## 2023-02-07 RX ORDER — HYDROMORPHONE HYDROCHLORIDE 1 MG/ML
0.2 INJECTION, SOLUTION INTRAMUSCULAR; INTRAVENOUS; SUBCUTANEOUS EVERY 5 MIN PRN
Status: DISCONTINUED | OUTPATIENT
Start: 2023-02-07 | End: 2023-02-07 | Stop reason: HOSPADM

## 2023-02-07 RX ORDER — LABETALOL HYDROCHLORIDE 5 MG/ML
10 INJECTION, SOLUTION INTRAVENOUS
Status: DISCONTINUED | OUTPATIENT
Start: 2023-02-07 | End: 2023-02-07 | Stop reason: HOSPADM

## 2023-02-07 RX ORDER — ALBUTEROL SULFATE 0.83 MG/ML
2.5 SOLUTION RESPIRATORY (INHALATION) EVERY 4 HOURS PRN
Status: DISCONTINUED | OUTPATIENT
Start: 2023-02-07 | End: 2023-02-07 | Stop reason: HOSPADM

## 2023-02-07 RX ORDER — ONDANSETRON 4 MG/1
4 TABLET, ORALLY DISINTEGRATING ORAL EVERY 30 MIN PRN
Status: DISCONTINUED | OUTPATIENT
Start: 2023-02-07 | End: 2023-02-07 | Stop reason: HOSPADM

## 2023-02-07 RX ADMIN — DEXAMETHASONE SODIUM PHOSPHATE 10 MG: 10 INJECTION, SOLUTION INTRAMUSCULAR; INTRAVENOUS at 11:35

## 2023-02-07 RX ADMIN — PHENYLEPHRINE HYDROCHLORIDE 100 MCG: 10 INJECTION INTRAVENOUS at 12:56

## 2023-02-07 RX ADMIN — SODIUM CHLORIDE: 9 INJECTION, SOLUTION INTRAVENOUS at 11:25

## 2023-02-07 RX ADMIN — PROTAMINE SULFATE 30 MG: 10 INJECTION, SOLUTION INTRAVENOUS at 15:01

## 2023-02-07 RX ADMIN — ONDANSETRON 4 MG: 2 INJECTION INTRAMUSCULAR; INTRAVENOUS at 14:52

## 2023-02-07 RX ADMIN — PROPOFOL 40 MG: 10 INJECTION, EMULSION INTRAVENOUS at 12:54

## 2023-02-07 RX ADMIN — PROTAMINE SULFATE 30 MG: 10 INJECTION, SOLUTION INTRAVENOUS at 14:57

## 2023-02-07 RX ADMIN — PROTAMINE SULFATE 30 MG: 10 INJECTION, SOLUTION INTRAVENOUS at 14:59

## 2023-02-07 RX ADMIN — PROPOFOL 40 MG: 10 INJECTION, EMULSION INTRAVENOUS at 11:37

## 2023-02-07 RX ADMIN — PROTAMINE SULFATE 10 MG: 10 INJECTION, SOLUTION INTRAVENOUS at 14:54

## 2023-02-07 RX ADMIN — GLYCOPYRROLATE 0.2 MG: 0.2 INJECTION INTRAMUSCULAR; INTRAVENOUS at 12:01

## 2023-02-07 RX ADMIN — PHENYLEPHRINE HYDROCHLORIDE 100 MCG: 10 INJECTION INTRAVENOUS at 11:53

## 2023-02-07 RX ADMIN — PHENYLEPHRINE HYDROCHLORIDE 100 MCG: 10 INJECTION INTRAVENOUS at 11:47

## 2023-02-07 RX ADMIN — PROPOFOL 160 MG: 10 INJECTION, EMULSION INTRAVENOUS at 11:34

## 2023-02-07 RX ADMIN — LIDOCAINE HYDROCHLORIDE 30 MG: 10 INJECTION, SOLUTION INFILTRATION; PERINEURAL at 11:34

## 2023-02-07 RX ADMIN — PROPOFOL 50 MG: 10 INJECTION, EMULSION INTRAVENOUS at 12:50

## 2023-02-07 RX ADMIN — ROCURONIUM BROMIDE 50 MG: 50 INJECTION, SOLUTION INTRAVENOUS at 11:35

## 2023-02-07 RX ADMIN — SUGAMMADEX 200 MG: 100 INJECTION, SOLUTION INTRAVENOUS at 14:52

## 2023-02-07 RX ADMIN — SODIUM CHLORIDE: 9 INJECTION, SOLUTION INTRAVENOUS at 14:45

## 2023-02-07 RX ADMIN — PHENYLEPHRINE HYDROCHLORIDE 0.3 MCG/KG/MIN: 10 INJECTION INTRAVENOUS at 11:49

## 2023-02-07 RX ADMIN — FENTANYL CITRATE 100 MCG: 50 INJECTION, SOLUTION INTRAMUSCULAR; INTRAVENOUS at 11:34

## 2023-02-07 ASSESSMENT — ENCOUNTER SYMPTOMS
SEIZURES: 0
DYSRHYTHMIAS: 1

## 2023-02-07 ASSESSMENT — ACTIVITIES OF DAILY LIVING (ADL)
ADLS_ACUITY_SCORE: 35

## 2023-02-07 NOTE — ANESTHESIA PROCEDURE NOTES
Airway       Patient location during procedure: OR       Procedure Start/Stop Times: 2/7/2023 11:38 AM  Staff -        CRNA: Lay Peres APRN CRNA       Performed By: CRNA  Consent for Airway        Urgency: elective  Indications and Patient Condition       Indications for airway management: reanna-procedural       Induction type:intravenous       Mask difficulty assessment: 1 - vent by mask    Final Airway Details       Final airway type: endotracheal airway       Successful airway: ETT - single and Oral  Endotracheal Airway Details        ETT size (mm): 8.0       Cuffed: yes       Cuff volume (mL): 7       Successful intubation technique: video laryngoscopy       VL Blade Size: Glidescope 4       Grade View of Cords: 1       Adjucts: stylet       Position: Right       Measured from: gums/teeth       Secured at (cm): 24       Bite block used: Soft    Post intubation assessment        Placement verified by: capnometry, equal breath sounds and chest rise        Number of attempts at approach: 1       Number of other approaches attempted: 0       Secured with: silk tape       Ease of procedure: easy       Dentition: Intact and Unchanged    Medication(s) Administered   Medication Administration Time: 2/7/2023 11:38 AM

## 2023-02-07 NOTE — ANESTHESIA CARE TRANSFER NOTE
Patient: Asher Cross    Procedure: Procedure(s):  Ablation Pulmonary Vein Isolation       Diagnosis: atrial fibrillation  Diagnosis Additional Information: No value filed.    Anesthesia Type:   General     Note:    Oropharynx: oropharynx clear of all foreign objects and spontaneously breathing  Level of Consciousness: awake and drowsy  Oxygen Supplementation: face mask  Level of Supplemental Oxygen (L/min / FiO2): 8  Independent Airway: airway patency satisfactory and stable  Dentition: dentition unchanged  Vital Signs Stable: post-procedure vital signs reviewed and stable  Report to RN Given: handoff report given  Patient transferred to: Cardiac [facility]          Vitals:  Vitals Value Taken Time   /63 1518   Temp 98.5 1518   Pulse 75 1518   Resp 12 1518   SpO2 100 1518       Electronically Signed By: KARI Stearns CRNA  February 7, 2023  3:20 PM

## 2023-02-07 NOTE — Clinical Note
Potential access sites were evaluated for patency using ultrasound.   The left femoral vein was selected. Access was obtained under with Sonosite guidance using a micropuncture 21 gauge needle with direct visualization of needle entry.

## 2023-02-07 NOTE — ANESTHESIA PREPROCEDURE EVALUATION
Anesthesia Pre-Procedure Evaluation    Patient: Asher Cross   MRN: [id number] : 1960        Procedure : Procedure(s):  Ablation Pulmonary Vein Isolation          Past Medical History:   Diagnosis Date     Paroxysmal atrial fibrillation (H) 2022    Dx 2022 [id number] score = 0      Snoring 2022      No past surgical history on file.   No Known Allergies   Social History     Tobacco Use     Smoking status: Never     Smokeless tobacco: Never   Substance Use Topics     Alcohol use: Yes     Comment: 10 drinks per week      Wt Readings from Last 1 Encounters:   23 82.5 kg (181 lb 12.8 oz)        Anesthesia Evaluation            ROS/MED HX  ENT/Pulmonary:     (+) NALINI risk factors,     Neurologic:    (-) no seizures and no CVA   Cardiovascular:     (+) -----dysrhythmias, a-fib, Previous cardiac testing   Echo: Date: Results:    Stress Test: Date: Results:  Stress echo done 2022:  1. Normal stress echocardiogram without evidence of stress induced ischemia.  2. Normal resting LV systolic performance with an ejection fraction of 55-60%.  There is normal improvement in left ventricular systolic performance with a  peak ejection fraction of 70-75%.  3. No ECG evidence of ischemia.  4. No anginal chest pain reported with exercise.  5. Good functional capacity for age.  6. No atrial fibrillation or other rhythm disturbances were detected.  ECG Reviewed: Date: Results:    Cath: Date: Results:      METS/Exercise Tolerance: >4 METS    Hematologic:       Musculoskeletal:       GI/Hepatic:  - neg GI/hepatic ROS     Renal/Genitourinary:  - neg Renal ROS     Endo:       Psychiatric/Substance Use:       Infectious Disease:       Malignancy:       Other:            Physical Exam    Airway        Mallampati: II   TM distance: > 3 FB   Neck ROM: full   Mouth opening: > 3 cm    Respiratory Devices and Support         Dental       (+) Completely normal teeth      Cardiovascular          Rhythm and rate:  regular and normal     Pulmonary           breath sounds clear to auscultation           OUTSIDE LABS:  CBC:   Lab Results   Component Value Date    WBC 6.1 01/31/2023    WBC 6.2 06/07/2022    HGB 14.9 01/31/2023    HGB 16.7 06/07/2022    HCT 44.9 01/31/2023    HCT 50.6 06/07/2022     01/31/2023     06/07/2022     BMP:   Lab Results   Component Value Date     01/31/2023     06/07/2022    POTASSIUM 4.4 01/31/2023    POTASSIUM 4.2 06/07/2022    CHLORIDE 106 01/31/2023    CHLORIDE 108 06/07/2022    CO2 26 01/31/2023    CO2 26 06/07/2022    BUN 22.1 01/31/2023    BUN 24 06/07/2022    CR 0.84 01/31/2023    CR 0.93 06/07/2022    GLC 82 01/31/2023    GLC 95 06/07/2022     COAGS:   Lab Results   Component Value Date    PTT 29 11/08/2005    INR 1.08 11/08/2005     POC: No results found for: BGM, HCG, HCGS  HEPATIC:   Lab Results   Component Value Date    ALBUMIN 3.6 06/07/2022    PROTTOTAL 6.8 06/07/2022    ALT 28 06/07/2022    AST 26 06/07/2022    ALKPHOS 73 06/07/2022    BILITOTAL 1.2 06/07/2022     OTHER:   Lab Results   Component Value Date    SCARLETT 9.3 01/31/2023    TSH 1.16 06/07/2022       Anesthesia Plan    ASA Status:  3   NPO Status:  NPO Appropriate    Anesthesia Type: General.     - Airway: ETT   Induction: Propofol.           Consents    Anesthesia Plan(s) and associated risks, benefits, and realistic alternatives discussed. Questions answered and patient/representative(s) expressed understanding.    - Discussed:     - Discussed with:  Patient      - Extended Intubation/Ventilatory Support Discussed: No.      - Patient is DNR/DNI Status: No    Use of blood products discussed: No .     Postoperative Care       PONV prophylaxis: Ondansetron (or other 5HT-3), Dexamethasone or Solumedrol     Comments:                David Flores MD

## 2023-02-07 NOTE — Clinical Note
[employer] Med system 12 lead EKG, hemodynamics 5 lead, pulse oximetery, NIBP, [employer] hands off defibrillator/external pacer, with 3 monitoring leads to patient. Baseline assessment done.

## 2023-02-07 NOTE — Clinical Note
Arrhythmia Type: atrial fibrillation.   Method of Cardioversion: synchronous.   Energy shock delivered: 200 joules.   Time shock delivered: 13:53 CST.

## 2023-02-08 NOTE — PLAN OF CARE
Patient was kept comfortable during post-procedure stay.VSS. Denies pain. Right and left femoral access sites remains dry & free from signs of bleeding even after removal of stopcock and ambulation. Patient is noted to have more bruising in the left groin but no hematoma noted. Appointments already made & included in AVS. Dr. Pritchard was able to speak with patient and his wife post procedure. Post-op instructions given to patient & spouse. Able to ask questions. Verbalized no concerns. Belongings returned. Discharged in stable condition.

## 2023-02-08 NOTE — DISCHARGE INSTRUCTIONS
Medications      Take your medications as prescribed.    It is important for you to continue your blood thinner without interruption, unless your electrophysiologist instructs you otherwise.      General instructions      Have an adult stay with you until tomorrow.    You may resume your normal diet.      You may shower tomorrow.  Do not take a bath or use a hot tub or pool for at least 1 week.     Activity recommendations      Do not drive for 3 days.    Avoid stooping or squatting more than 90 degrees at the hips for 7 days.    Avoid repetitive motions such as loading , vacuuming, raking or shoveling for 7 days.    Avoid heavy lifting (greater than 25lbs) for 7 days.            Groin care instructions      For the first 24 hours after your ablation, check the groin access sites every 1-2 hours while awake.    You may keep a Band-Aid over the puncture sites for 1 or 2 days post-procedure and thereafter may keep these sites uncovered.  Change the Band-Aid daily.  If there is minor oozing, apply another Band-Aid and remove it after 12 hours.    For 2 days, when you cough, sneeze, laugh or move your bowels, hold your hand over the puncture sites and press firmly.    Do not scrub the groin access sites.    Do not use lotion or powder near the groin access sites.           Arrhythmias following ablation     Recurrent atrial fibrillation and palpitations are common within the first 3 months post ablation while your heart recovers from the procedure.  These are usually more frequent in the first few weeks following ablation and should occur less frequently over time.  Please contact us if you are having frequent or long-lasting atrial fibrillation episodes following ablation.     Common findings after ablation     Bruising and a dime or pea size lump at the access sites is common.  If you notice increased swelling, external bleeding, or have other concerns regarding your groin access sites please call your  electrophysiology team s office, and if after hours consider emergency department evaluation.      Soreness and mild pain at your groin sites are normal, to help relieve this pain you can apply ice/cold packs to the sites for 20min 3-4 times per day.      Pleuritic chest discomfort (chest pain worse with taking deep breaths, worse with lying flat on your back) can occur after ablation, usually coming about within the first 24-72hrs post ablation.  If this occurs and is severe enough to be troublesome to you, please call us and consider starting a course of ibuprofen 400mg three times daily for 5 to 7 days.          Things to watch for      As with any type of procedure, please be more attentive to unusual symptoms post ablation (eg. fever, neurologic changes, pain with swallowing, loss of consciousness, etc) - we recommend ER evaluation for any such symptoms in the first few weeks post procedure.      Contact the EP Nurse line with any of the following.  Contact the cardiology on-call number after business hours.       Consider ER evaluation for severe symptoms.      Groin pain, swelling, or growing hard lump around the puncture sites.    Groin redness, tenderness, swelling, or drainage (blood or pus).    Neurological changes (for example: leg, arm or face weakness or numbness, difficulties with speech or word finding, problems walking or with your balance, vision changes).    Any numbness, coolness or changes in color in your extremities.   Sudden onset severe abdominal or back pain.   Moderate or severe chest pain not relieved by Tylenol or Ibuprofen, particularly after the first 48 hours.    Shortness of breath.    Chills or fever greater than 100 F.    Difficulty swallowing food or liquids.   Coughing up blood.    Blood in your stool.   Nausea and vomiting.   Difficulty urinating.     Recurrent atrial fibrillation associated with prolonged rapid heart rates (for example, heart rates over 140bpm for greater than  4 hours) or associated with additional concerning symptoms (for example, chest pain, lightheadedness, loss of consciousness, sweating).      If you are being evaluated at an emergency department, please tell your ER doctor that you have recently underwent an atrial fibrillation ablation and ask the ER to contact our office.  Many special considerations apply following ablation - these may be overlooked during an ER evaluation.     Our office will have a follow-up visit scheduled for you in approximately 6 weeks.  Please do not hesitate to call us before that time should issues arise.         [facility]      To reach the EP nurses working with Dr. Pritchard:   [phone].    To reach the general [facility] line or after hours service: [phone].    If you are calling after hours, please listen to the entire voicemail,  a live  will answer at the end of the message.

## 2023-02-10 ENCOUNTER — VIRTUAL VISIT (OUTPATIENT)
Dept: CARDIOLOGY | Facility: CLINIC | Age: 63
End: 2023-02-10
Payer: COMMERCIAL

## 2023-02-10 DIAGNOSIS — I48.0 PAROXYSMAL ATRIAL FIBRILLATION (H): Primary | ICD-10-CM

## 2023-02-10 PROCEDURE — 99207 PR NO CHARGE NURSE ONLY: CPT

## 2023-02-10 NOTE — PATIENT INSTRUCTIONS
Thank you for choosing [facility] for your Cardiology needs    After your phone call today with the nursing team, please keep in mind:      Please keep well hydrated, eat foods high in protein for wound healing.    Gradually increase your activity over the next 7-10 days, back to baseline, no aggressive activity for 7-10 days after the procedure.    No lifting more than 25 lb for 7-10 days after procedure.    No baths or hot tubs for 7-10 days after the procedure, showering is ok.    You are ok to drive.  Keep your incision sites clean, dry and open to air.    You may use ice packs to your groin sites for mild pain or swelling;  20 minutes on and 20 minutes off.    CONTINUE to take your anticoagulant (Eliquis) as directed to prevent clot formation and possible stroke.            Call 911 with any stroke symptoms:                    Difficulty with speech or speech changes                     Problems walking or with your balance                 Problems with your vision or vision changes     One side of your face droops or feels numb     Muscle weakness on one side of your body and not the other    If you experience any of the following in the next 6 weeks, please call Dr. Pritchard's nursing team as soon as possible:    Difficulty swallowing or throat pain  Dizziness or shortness of breath  Heart burn   Episodes of afib lasting 4-6 hours or if frequently out of rhythm (daily episodes)  Swelling in your hands, feet or abdomen  ANY fever of >100.5 for any reason  Any CHEST PAIN after procedure especially in the 2-6 weeks after the procedure  Changes to your groin sites including an increase in pain or swelling, any redness or drainage, an increase in bruising or hardening of the site.    Follow up with EP Nurse Practitioner at 4-6 weeks.   We will call you to set up appointment for 3 month office visit with Dr. Pritchard or the EP Nurse Practitioner.    Thank you,  Rosalba Zacarias RN  [phone] /After  hours or scheduling [phone]

## 2023-02-10 NOTE — PROGRESS NOTES
Pt is s/p PVI PO day 3, today 2/10/2023 , PVI was completed on 2-7-23 with Dr. Pritchard and pt was discharged the same day.  PC was placed to pt, spoke to pt    General Assessment:     Weight: Pt reports pt is unable to report weight, but denies s/s of fluid retention    Vital signs:  Pt is not able to measure blood pressure or heart rate and Pt has been afebrile.    Pain: Pt reports mild to moderate pain at groin sites, reviewed with pt on how to mitigate pain at home s/p during recovery (ice, rest, OTC tylenol)     /GI: Pt denies difficulty swallowing, denies heart burn, denies constipation, denies urinary retention/difficulty and reports staying hydrated.    Respiratory: Pt denies SOB, denies difficulty breathing, denies throat pain and denies changes/abnormal sputum.    Activity: Pt is gradually working into baseline activity.     Neurological:  Pt denies vision changes, changes in speech, changes in balance and changes in strength or motor function.    Rhythm Assessment:   Pt denies palpitations, denies irregularities in HR or rhythm and denies symptoms or sustained AF episodes.    Procedure Site Assessment:   Pt reports some bruising around sites without significant change from hospital discharge and normal to PVI recovery    Anticoagulation/Medication:  Pt remain on Eliquis without interruption  Pt will start Aspirin 81 mg once daily for 30 days post procedure    Education completed with pt at this visit:  Reviewed normal post-op PVI healing process, when to contact EP-RN/EP-MD, contact information was given to the pt for further concerns or questions, after hours contact was reviewed with patient and pt verbalized understanding    Follow up:  AVS mailed to patient and is available through my chart.  Pt Does have a 6 week follow up scheduled with Hali Osei  on 3-21-23.    2/10/2023 11:31 AM  Rosalba Zacarias RN

## 2023-03-06 NOTE — ANESTHESIA POSTPROCEDURE EVALUATION
Patient: Asher Cross    Procedure: Procedure(s):  Ablation Pulmonary Vein Isolation       Anesthesia Type:  General    Note:  Disposition: Outpatient   Postop Pain Control: Uneventful            Sign Out: Well controlled pain   PONV: No   Neuro/Psych: Uneventful            Sign Out: Acceptable/Baseline neuro status   Airway/Respiratory: Uneventful            Sign Out: Acceptable/Baseline resp. status   CV/Hemodynamics: Uneventful            Sign Out: Acceptable CV status; No obvious hypovolemia; No obvious fluid overload   Other NRE: NONE   DID A NON-ROUTINE EVENT OCCUR? No           Last vitals:  There were no vitals filed for this visit.    Electronically Signed By: Cecilia Currie MD  March 6, 2023  12:36 PM

## 2023-03-06 NOTE — ADDENDUM NOTE
Addendum  created 03/06/23 1236 by Cecilia Currie MD    Attestation recorded in Intraprocedure, Clinical Note Signed, Intraprocedure Attestations filed

## 2023-03-21 ENCOUNTER — OFFICE VISIT (OUTPATIENT)
Dept: CARDIOLOGY | Facility: CLINIC | Age: 63
End: 2023-03-21
Payer: COMMERCIAL

## 2023-03-21 VITALS
WEIGHT: 178 LBS | RESPIRATION RATE: 16 BRPM | HEART RATE: 82 BPM | HEIGHT: 75 IN | SYSTOLIC BLOOD PRESSURE: 122 MMHG | BODY MASS INDEX: 22.13 KG/M2 | DIASTOLIC BLOOD PRESSURE: 62 MMHG

## 2023-03-21 DIAGNOSIS — G47.30 SLEEP-DISORDERED BREATHING: ICD-10-CM

## 2023-03-21 DIAGNOSIS — I48.0 PAROXYSMAL ATRIAL FIBRILLATION (H): Primary | ICD-10-CM

## 2023-03-21 DIAGNOSIS — Z98.890 S/P ABLATION OF ATRIAL FIBRILLATION: ICD-10-CM

## 2023-03-21 DIAGNOSIS — Z86.79 S/P ABLATION OF ATRIAL FIBRILLATION: ICD-10-CM

## 2023-03-21 PROCEDURE — 99214 OFFICE O/P EST MOD 30 MIN: CPT | Performed by: NURSE PRACTITIONER

## 2023-03-21 NOTE — PATIENT INSTRUCTIONS
Asher Cross,    It was a pleasure to see you today at the [facility].     My recommendations after this visit include:    Continue Eliquis 5 mg every 12 hours    Wear heart monitor for 2 weeks    Follow up in 6 weeks    Hali Osei, CNP  [facility], Electrophysiology  [phone]  EP nurses [phone]

## 2023-03-21 NOTE — LETTER
3/21/2023    [facility]  [address], [address]  [place] [address]    RE: Asher Cross       Dear Colleague,     I had the pleasure of seeing Asher Cross in the [facility].    HEART CARE ELECTROPHYSIOLOGY NOTE      [facility]  [phone]      Assessment/Recommendations   Assessment/Plan:  1.  Paroxysmal Atrial Fibrillation: No symptomatology or evidence of A-fib.  He remains off membrane active antiarrhythmic medications.  He has had an uneventful recovery from ablation with no emergency department or unscheduled clinic visits.  MCOT x2 weeks to evaluate for recurrent AF.    He has a [id number] score of 0.  HAS-BLED score of 0.   Continue Eliquis 5 mg twice daily for stroke prophylaxis for at least 3 months post ablation.       2.  Possible sleep apnea:  In lab sleep study scheduled for 5/19/2023.  We reviewed the correlation between untreated sleep apnea and atrial arrhythmias.  Strongly recommend treatment if indicated.    Follow-up 3 months post PVI     History of Present Illness/Subjective    HPI: Asher Cross is a 62 year old male who comes in for EP follow up of atrial fibrillation.  He has a history of atrial fibrillation and hyperlipidemia.   Due to nocturnal onset of A. fib, evaluation for sleep apnea was recommended by Dr. Pritchard.  Sleep medicine consult done 1/19/2023, in lab sleep study recommended, scheduled for 5/19/2023.    He was diagnosed with atrial fibrillation with rapid ventricular response in June 2022.  He underwent urgent cardioversion in the ED.  Symptoms consist of palpitations, weakness, and shortness of breath.  He had another episode in December that lasted all day and spontaneously converted overnight.  He was using pill in the pocket flecainide with termination of AF shortly after taking.  He underwent ablation by Dr. Pritchard on 2/7/2023 with cryo to all 4 pulmonary veins and additional RF to the anterior  RSPV.  Left atrial mapping showed normal voltage without attenuation or signal fractionation.  He is on Eliquis for stroke prophylaxis.    Asher states that he feels well.  He has not had any A-fib.  He reports an uneventful recovery from ablation, denies groin site issues, significant heartburn, difficulty swallowing, or neurologic changes.  He denies chest discomfort, palpitations, abdominal fullness/bloating or peripheral edema, shortness of breath, paroxysmal nocturnal dyspnea, orthopnea, lightheadedness, dizziness, pre-syncope, or syncope.    Cardiographics (EKG personally reviewed):  EKG done 1/31/2023 shows sinus bradycardia 53 bpm, incomplete right bundle branch block,  ms, QT/QTc 416/390 ms.  EKG done 6/7/2022 shows atrial fibrillation with mildly elevated ventricular response at 98 bpm    Echo done 6/24/2022:  The left ventricle is normal in size.  Left ventricular systolic function is normal.  The visual ejection fraction is 55-60%.  No regional wall motion abnormalities noted.  Diastolic Doppler findings (E/E' ratio and/or other parameters) suggest left  ventricular filling pressures are normal.  The aortic valve is trileaflet with aortic valve sclerosis.  There is trace aortic regurgitation.  Sinus rhythm was noted.  There is no comparison study available.    Stress echo done 12/19/2022:  1. Normal stress echocardiogram without evidence of stress induced ischemia.  2. Normal resting LV systolic performance with an ejection fraction of 55-60%.  There is normal improvement in left ventricular systolic performance with a  peak ejection fraction of 70-75%.  3. No ECG evidence of ischemia.  4. No anginal chest pain reported with exercise.  5. Good functional capacity for age.  6. No atrial fibrillation or other rhythm disturbances were detected.    I have reviewed and updated the patient's Past Medical History, Social History, Family History and Medication List.  Outside records personally reviewed.  "    Physical Examination  Review of Systems   Vitals: /62 (BP Location: Right arm, Patient Position: Sitting, Cuff Size: Adult Regular)   Pulse 82   Resp 16   Ht 1.905 m (6' 3\")   Wt 80.7 kg (178 lb)   BMI 22.25 kg/m    BMI= Body mass index is 22.25 kg/m .  Wt Readings from Last 3 Encounters:   03/21/23 80.7 kg (178 lb)   02/07/23 82.1 kg (181 lb)   01/31/23 82.5 kg (181 lb 12.8 oz)       General Appearance:   Alert, well-appearing and in no acute distress.   HEENT: Atraumatic, normocephalic.  No scleral icterus, normal conjunctivae, EOMs intact, PERRL.  Wearing a mask.   Chest/Lungs:   Chest symmetric, spine straight.  Respirations unlabored.  Lungs are clear to auscultation.   Cardiovascular:   Regular rate and rhythm.  Normal first and second heart sounds with no murmurs, rubs, or gallops; radial and posterior tibial pulses are intact, no edema.   Abdomen:  Soft, nondistended, bowel sounds present.   Extremities: No cyanosis or clubbing.   Musculoskeletal: Moves all extremities.    Skin: Warm, dry, intact.    Neurologic: Mood and affect are appropriate.  Alert and oriented to person, place, time, and situation.     ROS: 10 point ROS neg other than the symptoms noted above in the HPI.         Medical History  Surgical History Family History Social History   Past Medical History:   Diagnosis Date     Paroxysmal atrial fibrillation (H) 12/1/2022    Dx Jun 2022 [id number] score = 0      Snoring 12/2/2022     Past Surgical History:   Procedure Laterality Date     EP ABLATION PULMONARY VEIN ISOLATION  02/07/2023    cryo-PVs, RF to RSPV, Dr. Pritchard     Family History   Problem Relation Age of Onset     Atrial fibrillation Mother      Dementia Mother      Pacemaker Father      Colon Cancer Brother      Atrial fibrillation Brother         Social History     Socioeconomic History     Marital status:      Spouse name: Not on file     Number of children: 2     Years of education: Not on file     Highest " education level: Not on file   Occupational History     Occupation: Tech    Tobacco Use     Smoking status: Never     Smokeless tobacco: Never   Vaping Use     Vaping Use: Never used   Substance and Sexual Activity     Alcohol use: Yes     Comment: 10 drinks per week     Drug use: Never     Sexual activity: Yes     Partners: Female     Birth control/protection: Post-menopausal   Other Topics Concern     Not on file   Social History Narrative     Not on file     Social Determinants of Health     Financial Resource Strain: Not on file   Food Insecurity: Not on file   Transportation Needs: Not on file   Physical Activity: Not on file   Stress: Not on file   Social Connections: Not on file   Intimate Partner Violence: Not on file   Housing Stability: Not on file           Medications  Allergies   Current Outpatient Medications   Medication Sig Dispense Refill     apixaban ANTICOAGULANT (ELIQUIS ANTICOAGULANT) 5 MG tablet Take 1 tablet (5 mg) by mouth 2 times daily 180 tablet 1     rosuvastatin (CRESTOR) 20 MG tablet Take 20 mg by mouth daily       No Known Allergies       Lab Results    Chemistry/lipid CBC Cardiac Enzymes/BNP/TSH/INR   Recent Labs   Lab Test 02/07/23  1031 01/31/23  1349    141   POTASSIUM 4.5 4.4   CHLORIDE 107 106   CO2 26 26   ANIONGAP 7 9   GLC 91 82   BUN 22.0 22.1   CR 0.87 0.84   SCARLETT 9.0 9.3        CBC RESULTS: Recent Labs   Lab Test 02/07/23  1031   WBC 4.7   RBC 4.87   HGB 14.3   HCT 43.8   MCV 90   MCH 29.4   MCHC 32.6   RDW 12.8         No results for input(s): TROPONINI in the last 63494 hours.  No results for input(s): BNP, NTBNPI, NTBNP in the last 04243 hours.  Recent Labs   Lab Test 06/07/22  1314   TSH 1.16                  Thank you for allowing me to participate in the care of your patient.      Sincerely,     KARI Roach [facility]  cc:   Karan Pritchard MD  [address] [place] [address]  [phone]  [place] [address]

## 2023-03-21 NOTE — PROGRESS NOTES
HEART CARE ELECTROPHYSIOLOGY NOTE      [facility]  [phone]      Assessment/Recommendations   Assessment/Plan:  1.  Paroxysmal Atrial Fibrillation: No symptomatology or evidence of A-fib.  He remains off membrane active antiarrhythmic medications.  He has had an uneventful recovery from ablation with no emergency department or unscheduled clinic visits.  MCOT x2 weeks to evaluate for recurrent AF.    He has a [id number] score of 0.  HAS-BLED score of 0.   Continue Eliquis 5 mg twice daily for stroke prophylaxis for at least 3 months post ablation.       2.  Possible sleep apnea:  In lab sleep study scheduled for 5/19/2023.  We reviewed the correlation between untreated sleep apnea and atrial arrhythmias.  Strongly recommend treatment if indicated.    Follow-up 3 months post PVI     History of Present Illness/Subjective    HPI: Asher Cross is a 62 year old male who comes in for EP follow up of atrial fibrillation.  He has a history of atrial fibrillation and hyperlipidemia.   Due to nocturnal onset of A. fib, evaluation for sleep apnea was recommended by Dr. Pritchard.  Sleep medicine consult done 1/19/2023, in lab sleep study recommended, scheduled for 5/19/2023.    He was diagnosed with atrial fibrillation with rapid ventricular response in June 2022.  He underwent urgent cardioversion in the ED.  Symptoms consist of palpitations, weakness, and shortness of breath.  He had another episode in December that lasted all day and spontaneously converted overnight.  He was using pill in the pocket flecainide with termination of AF shortly after taking.  He underwent ablation by Dr. Pritchard on 2/7/2023 with cryo to all 4 pulmonary veins and additional RF to the anterior RSPV.  Left atrial mapping showed normal voltage without attenuation or signal fractionation.  He is on Eliquis for stroke prophylaxis.    Asher states that he feels well.  He has not had any A-fib.  He reports an uneventful recovery  "from ablation, denies groin site issues, significant heartburn, difficulty swallowing, or neurologic changes.  He denies chest discomfort, palpitations, abdominal fullness/bloating or peripheral edema, shortness of breath, paroxysmal nocturnal dyspnea, orthopnea, lightheadedness, dizziness, pre-syncope, or syncope.    Cardiographics (EKG personally reviewed):  EKG done 1/31/2023 shows sinus bradycardia 53 bpm, incomplete right bundle branch block,  ms, QT/QTc 416/390 ms.  EKG done 6/7/2022 shows atrial fibrillation with mildly elevated ventricular response at 98 bpm    Echo done 6/24/2022:  The left ventricle is normal in size.  Left ventricular systolic function is normal.  The visual ejection fraction is 55-60%.  No regional wall motion abnormalities noted.  Diastolic Doppler findings (E/E' ratio and/or other parameters) suggest left  ventricular filling pressures are normal.  The aortic valve is trileaflet with aortic valve sclerosis.  There is trace aortic regurgitation.  Sinus rhythm was noted.  There is no comparison study available.    Stress echo done 12/19/2022:  1. Normal stress echocardiogram without evidence of stress induced ischemia.  2. Normal resting LV systolic performance with an ejection fraction of 55-60%.  There is normal improvement in left ventricular systolic performance with a  peak ejection fraction of 70-75%.  3. No ECG evidence of ischemia.  4. No anginal chest pain reported with exercise.  5. Good functional capacity for age.  6. No atrial fibrillation or other rhythm disturbances were detected.    I have reviewed and updated the patient's Past Medical History, Social History, Family History and Medication List.  Outside records personally reviewed.     Physical Examination  Review of Systems   Vitals: /62 (BP Location: Right arm, Patient Position: Sitting, Cuff Size: Adult Regular)   Pulse 82   Resp 16   Ht 1.905 m (6' 3\")   Wt 80.7 kg (178 lb)   BMI 22.25 kg/m    BMI= " Body mass index is 22.25 kg/m .  Wt Readings from Last 3 Encounters:   03/21/23 80.7 kg (178 lb)   02/07/23 82.1 kg (181 lb)   01/31/23 82.5 kg (181 lb 12.8 oz)       General Appearance:   Alert, well-appearing and in no acute distress.   HEENT: Atraumatic, normocephalic.  No scleral icterus, normal conjunctivae, EOMs intact, PERRL.  Wearing a mask.   Chest/Lungs:   Chest symmetric, spine straight.  Respirations unlabored.  Lungs are clear to auscultation.   Cardiovascular:   Regular rate and rhythm.  Normal first and second heart sounds with no murmurs, rubs, or gallops; radial and posterior tibial pulses are intact, no edema.   Abdomen:  Soft, nondistended, bowel sounds present.   Extremities: No cyanosis or clubbing.   Musculoskeletal: Moves all extremities.    Skin: Warm, dry, intact.    Neurologic: Mood and affect are appropriate.  Alert and oriented to person, place, time, and situation.     ROS: 10 point ROS neg other than the symptoms noted above in the HPI.         Medical History  Surgical History Family History Social History   Past Medical History:   Diagnosis Date     Paroxysmal atrial fibrillation (H) 12/1/2022    Dx Jun 2022 [id number] score = 0      Snoring 12/2/2022     Past Surgical History:   Procedure Laterality Date     EP ABLATION PULMONARY VEIN ISOLATION  02/07/2023    cryo-PVs, RF to RSPV, Dr. Pritchard     Family History   Problem Relation Age of Onset     Atrial fibrillation Mother      Dementia Mother      Pacemaker Father      Colon Cancer Brother      Atrial fibrillation Brother         Social History     Socioeconomic History     Marital status:      Spouse name: Not on file     Number of children: 2     Years of education: Not on file     Highest education level: Not on file   Occupational History     Occupation: Tech    Tobacco Use     Smoking status: Never     Smokeless tobacco: Never   Vaping Use     Vaping Use: Never used   Substance and Sexual Activity     Alcohol  use: Yes     Comment: 10 drinks per week     Drug use: Never     Sexual activity: Yes     Partners: Female     Birth control/protection: Post-menopausal   Other Topics Concern     Not on file   Social History Narrative     Not on file     Social Determinants of Health     Financial Resource Strain: Not on file   Food Insecurity: Not on file   Transportation Needs: Not on file   Physical Activity: Not on file   Stress: Not on file   Social Connections: Not on file   Intimate Partner Violence: Not on file   Housing Stability: Not on file           Medications  Allergies   Current Outpatient Medications   Medication Sig Dispense Refill     apixaban ANTICOAGULANT (ELIQUIS ANTICOAGULANT) 5 MG tablet Take 1 tablet (5 mg) by mouth 2 times daily 180 tablet 1     rosuvastatin (CRESTOR) 20 MG tablet Take 20 mg by mouth daily       No Known Allergies       Lab Results    Chemistry/lipid CBC Cardiac Enzymes/BNP/TSH/INR   Recent Labs   Lab Test 02/07/23  1031 01/31/23  1349    141   POTASSIUM 4.5 4.4   CHLORIDE 107 106   CO2 26 26   ANIONGAP 7 9   GLC 91 82   BUN 22.0 22.1   CR 0.87 0.84   SCARLETT 9.0 9.3        CBC RESULTS: Recent Labs   Lab Test 02/07/23  1031   WBC 4.7   RBC 4.87   HGB 14.3   HCT 43.8   MCV 90   MCH 29.4   MCHC 32.6   RDW 12.8         No results for input(s): TROPONINI in the last 55611 hours.  No results for input(s): BNP, NTBNPI, NTBNP in the last 80676 hours.  Recent Labs   Lab Test 06/07/22  1314   TSH 1.16

## 2023-05-15 ENCOUNTER — MEDICAL CORRESPONDENCE (OUTPATIENT)
Dept: HEALTH INFORMATION MANAGEMENT | Facility: CLINIC | Age: 63
End: 2023-05-15
Payer: COMMERCIAL

## 2023-05-15 DIAGNOSIS — E78.5 DYSLIPIDEMIA: Primary | ICD-10-CM

## 2023-05-19 ENCOUNTER — THERAPY VISIT (OUTPATIENT)
Dept: SLEEP MEDICINE | Facility: CLINIC | Age: 63
End: 2023-05-19
Attending: NURSE PRACTITIONER
Payer: COMMERCIAL

## 2023-05-19 DIAGNOSIS — I48.0 PAROXYSMAL ATRIAL FIBRILLATION (H): ICD-10-CM

## 2023-05-19 DIAGNOSIS — G47.30 SLEEP-DISORDERED BREATHING: ICD-10-CM

## 2023-05-19 PROCEDURE — 95810 POLYSOM 6/> YRS 4/> PARAM: CPT | Performed by: INTERNAL MEDICINE

## 2023-05-20 NOTE — PATIENT INSTRUCTIONS
[facility]    1. Your sleep study will be reviewed by a sleep physician within the next few days.     2. Please follow up in the sleep clinic as scheduled, or, make an appointment with your sleep provider to be seen within two weeks to discuss the results of the sleep study.    3. If you have any questions or problems with your treatment plan, please contact your sleep clinic provider at [phone] to further manage your condition.    4. Please review your attached medication list, and, at your follow-up appointment advise your sleep clinic provider about any changes.    5. Go to [email or web address] for more information about your sleep problems.      May 20, 2023

## 2023-05-23 ENCOUNTER — HOSPITAL ENCOUNTER (OUTPATIENT)
Dept: CARDIOLOGY | Facility: HOSPITAL | Age: 63
Discharge: HOME | End: 2023-05-23
Attending: NURSE PRACTITIONER
Payer: COMMERCIAL

## 2023-05-23 DIAGNOSIS — I48.0 PAROXYSMAL ATRIAL FIBRILLATION (H): ICD-10-CM

## 2023-05-23 DIAGNOSIS — Z98.890 S/P ABLATION OF ATRIAL FIBRILLATION: ICD-10-CM

## 2023-05-23 DIAGNOSIS — Z86.79 S/P ABLATION OF ATRIAL FIBRILLATION: ICD-10-CM

## 2023-05-23 PROCEDURE — 999N000096 CARDIAC MOBILE TELEMETRY MONITOR

## 2023-05-30 LAB — SLPCOMP: NORMAL

## 2023-06-01 ENCOUNTER — HEALTH MAINTENANCE LETTER (OUTPATIENT)
Age: 63
End: 2023-06-01

## 2023-06-07 PROCEDURE — 93228 REMOTE 30 DAY ECG REV/REPORT: CPT | Performed by: INTERNAL MEDICINE

## 2023-06-08 ENCOUNTER — VIRTUAL VISIT (OUTPATIENT)
Dept: SLEEP MEDICINE | Facility: CLINIC | Age: 63
End: 2023-06-08
Payer: COMMERCIAL

## 2023-06-08 VITALS — BODY MASS INDEX: 22.84 KG/M2 | WEIGHT: 178 LBS | HEIGHT: 74 IN

## 2023-06-08 DIAGNOSIS — R06.83 SNORING: Primary | ICD-10-CM

## 2023-06-08 PROCEDURE — 99213 OFFICE O/P EST LOW 20 MIN: CPT | Mod: VID | Performed by: NURSE PRACTITIONER

## 2023-06-08 ASSESSMENT — SLEEP AND FATIGUE QUESTIONNAIRES
HOW LIKELY ARE YOU TO NOD OFF OR FALL ASLEEP IN A CAR, WHILE STOPPED FOR A FEW MINUTES IN TRAFFIC: WOULD NEVER DOZE
HOW LIKELY ARE YOU TO NOD OFF OR FALL ASLEEP WHILE SITTING INACTIVE IN A PUBLIC PLACE: WOULD NEVER DOZE
HOW LIKELY ARE YOU TO NOD OFF OR FALL ASLEEP WHILE SITTING QUIETLY AFTER LUNCH WITHOUT ALCOHOL: WOULD NEVER DOZE
HOW LIKELY ARE YOU TO NOD OFF OR FALL ASLEEP WHILE SITTING AND READING: WOULD NEVER DOZE
HOW LIKELY ARE YOU TO NOD OFF OR FALL ASLEEP WHEN YOU ARE A PASSENGER IN A CAR FOR AN HOUR WITHOUT A BREAK: WOULD NEVER DOZE
HOW LIKELY ARE YOU TO NOD OFF OR FALL ASLEEP WHILE LYING DOWN TO REST IN THE AFTERNOON WHEN CIRCUMSTANCES PERMIT: MODERATE CHANCE OF DOZING
HOW LIKELY ARE YOU TO NOD OFF OR FALL ASLEEP WHILE WATCHING TV: MODERATE CHANCE OF DOZING
HOW LIKELY ARE YOU TO NOD OFF OR FALL ASLEEP WHILE SITTING AND TALKING TO SOMEONE: WOULD NEVER DOZE

## 2023-06-08 ASSESSMENT — PAIN SCALES - GENERAL: PAINLEVEL: NO PAIN (0)

## 2023-06-08 NOTE — NURSING NOTE
Is the patient currently in the state of [place]? YES    Visit mode:VIDEO    If the visit is dropped, the patient can be reconnected by: VIDEO VISIT: Send to e-mail at: [email or web address]    Will anyone else be joining the visit? NO      How would you like to obtain your AVS? MyChart    Are changes needed to the allergy or medication list? NO    Reason for visit: RECHECK    Has patient had flu shot for current/most recent flu season? If so, when? Yes: 11/14/2022

## 2023-06-08 NOTE — PROGRESS NOTES
Virtual Visit Details    Type of service:  Video Visit   0 8:25 AM-0 8:34 AM    Originating Location (pt. Location): Home  Distant Location (provider location):  Off-site  Platform used for Video Visit: Well

## 2023-06-08 NOTE — PROGRESS NOTES
Sleep Study Follow-Up Visit:    Date on this visit: 6/8/2023    Asher Cross comes in today for follow-up of his sleep study done on 5/19/2023 at the [facility] for possible sleep apnea, atrial fibrillation.    5/19/2023 [facility] [place] [facility] Study (175.0 lbs) - AHI 0.5, RDI 0.6, Supine AHI 0.8, REM AHI -, Low O2 57.0%, Time Spent ?88% 0 minutes / Time Spent ?89% 0 minutes       These findings were reviewed with patient.     Past medical/surgical history, family history, social history, medications and allergies were reviewed.      Problem List:  Patient Active Problem List    Diagnosis Date Noted     S/P ablation of atrial fibrillation 03/21/2023     Priority: Medium     Sleep-disordered breathing 01/19/2023     Priority: Medium     Snoring 12/02/2022     Priority: Medium     Paroxysmal atrial fibrillation (H) 12/01/2022     Priority: Medium     Dx Jun 2022  [id number] score = 0  CV 6/7/2022 ([facility])         Mitral valve prolapse 12/01/2022     Priority: Medium        Impression/Plan:  Mild snoring, without sleep-related breathing disorder   Recommend optimizing his sleep schedule, and sleep hygiene practices to mitigate any sleep disturbance   He will follow up with me on an as-needed basis.   Recommend safe driving practices including not driving a motor vehicle if he becomes drowsy   Reviewed symptoms of sleep disordered breathing, and when to seek another sleep study, such as weight gain.  Also reviewed signs and symptoms of atrial fibrillation as patient states that he recently had an ablation.        20 minutes spent with patient, all of which were spent face-to-face counseling, consulting, coordinating plan of care.      KARI Crump, CNP  Sleep Medicine      This note was written with the assistance of the Dragon voice-dictation technology software. The final document, although reviewed, may contain errors. For corrections, please contact the  office.      CC: Clinic, [facility]

## 2023-06-09 ENCOUNTER — OFFICE VISIT (OUTPATIENT)
Dept: CARDIOLOGY | Facility: CLINIC | Age: 63
End: 2023-06-09
Attending: NURSE PRACTITIONER
Payer: COMMERCIAL

## 2023-06-09 VITALS
RESPIRATION RATE: 16 BRPM | HEART RATE: 80 BPM | DIASTOLIC BLOOD PRESSURE: 60 MMHG | BODY MASS INDEX: 23.11 KG/M2 | WEIGHT: 180 LBS | SYSTOLIC BLOOD PRESSURE: 102 MMHG

## 2023-06-09 DIAGNOSIS — Z86.79 S/P ABLATION OF ATRIAL FIBRILLATION: ICD-10-CM

## 2023-06-09 DIAGNOSIS — Z98.890 S/P ABLATION OF ATRIAL FIBRILLATION: ICD-10-CM

## 2023-06-09 DIAGNOSIS — I48.0 PAROXYSMAL ATRIAL FIBRILLATION (H): ICD-10-CM

## 2023-06-09 PROCEDURE — 99213 OFFICE O/P EST LOW 20 MIN: CPT | Performed by: NURSE PRACTITIONER

## 2023-06-09 RX ORDER — ASPIRIN 81 MG/1
81 TABLET ORAL DAILY
COMMUNITY
Start: 2023-06-09

## 2023-06-09 NOTE — LETTER
6/9/2023    [facility]  [address], [address]  [place] [address]    RE: Asher Cross       Dear Colleague,     I had the pleasure of seeing Asher Cross in the [facility]alth [facility].    HEART CARE ELECTROPHYSIOLOGY NOTE      [facility]  [phone]      Assessment/Recommendations   Assessment/Plan:  Paroxysmal Atrial Fibrillation: No symptomatology or evidence of A-fib.  He remains off membrane active antiarrhythmic medications.  He discussed risk for AF recurrence post ablation and avoidance of possible triggers.    He has a [id number] score of 0.  HAS-BLED score of 0.  It was discontinued 3 months post ablation.  Recommend aspirin 81 mg daily for stroke prophylaxis.       Follow-up 1 year post PVI     History of Present Illness/Subjective    HPI: Asher Cross is a 62 year old male who comes in for EP follow up of atrial fibrillation.  He has a history of atrial fibrillation and hyperlipidemia.   Due to nocturnal onset of A. fib, evaluation for sleep apnea was recommended by Dr. Pritchard.  Sleep study showed mild snoring, but no sleep apnea.    He was diagnosed with atrial fibrillation with rapid ventricular response in June 2022.  He underwent urgent cardioversion in the ED.  Symptoms consist of palpitations, weakness, and shortness of breath.  He had another episode in December that lasted all day and spontaneously converted overnight.  He was using pill in the pocket flecainide with termination of AF shortly after taking.  He underwent ablation by Dr. Pritchard on 2/7/2023 with cryo to all 4 pulmonary veins and additional RF to the anterior RSPV.  Left atrial mapping showed normal voltage without attenuation or signal fractionation.  He is on Eliquis for stroke prophylaxis for 3 months post ablation.    Asher states that he feels well.  He is sometimes more aware of his heartbeat and notes that it is generally faster than prior to ablation, but he has  not had any A-fib.  He denies chest discomfort, palpitations, abdominal fullness/bloating or peripheral edema, shortness of breath, paroxysmal nocturnal dyspnea, orthopnea, lightheadedness, dizziness, pre-syncope, or syncope.    Cardiographics (EKG personally reviewed):  EKG done 1/31/2023 shows sinus bradycardia 53 bpm, incomplete right bundle branch block,  ms, QT/QTc 416/390 ms.  EKG done 6/7/2022 shows atrial fibrillation with mildly elevated ventricular response at 98 bpm    MCOT worn 5/23/2023 to 6/5/2023 shows sinus rhythm with an average heart rate of 77 bpm and a range of 50 to 120 bpm.  No significant bradycardia or pauses.  No atrial fibrillation or other tachyarrhythmia.  No significant ventricular ectopy.    Echo done 6/24/2022:  The left ventricle is normal in size.  Left ventricular systolic function is normal.  The visual ejection fraction is 55-60%.  No regional wall motion abnormalities noted.  Diastolic Doppler findings (E/E' ratio and/or other parameters) suggest left  ventricular filling pressures are normal.  The aortic valve is trileaflet with aortic valve sclerosis.  There is trace aortic regurgitation.  Sinus rhythm was noted.  There is no comparison study available.    Stress echo done 12/19/2022:  1. Normal stress echocardiogram without evidence of stress induced ischemia.  2. Normal resting LV systolic performance with an ejection fraction of 55-60%.  There is normal improvement in left ventricular systolic performance with a  peak ejection fraction of 70-75%.  3. No ECG evidence of ischemia.  4. No anginal chest pain reported with exercise.  5. Good functional capacity for age.  6. No atrial fibrillation or other rhythm disturbances were detected.    I have reviewed and updated the patient's Past Medical History, Social History, Family History and Medication List.  Outside records personally reviewed.     Physical Examination  Review of Systems   Vitals: /60 (BP Location:  Right arm, Patient Position: Sitting, Cuff Size: Adult Large)   Pulse 80   Resp 16   Wt 81.6 kg (180 lb)   BMI 23.11 kg/m    BMI= Body mass index is 23.11 kg/m .  Wt Readings from Last 3 Encounters:   06/09/23 81.6 kg (180 lb)   06/08/23 80.7 kg (178 lb)   03/21/23 80.7 kg (178 lb)       General Appearance:   Alert, well-appearing and in no acute distress.   HEENT: Atraumatic, normocephalic.  No scleral icterus, normal conjunctivae, EOMs intact, PERRL.  Mucous membranes pink and moist.   Chest/Lungs:   Chest symmetric, spine straight.  Respirations unlabored.  Lungs are clear to auscultation.   Cardiovascular:   Regular rate and rhythm.  Normal first and second heart sounds with no murmurs, rubs, or gallops; radial pulses are intact, no edema.   Abdomen:  Soft, nondistended, bowel sounds present.   Extremities: No cyanosis or clubbing.   Musculoskeletal: Moves all extremities.    Skin: Warm, dry, intact.    Neurologic: Mood and affect are appropriate.  Alert and oriented to person, place, time, and situation.     ROS: 10 point ROS neg other than the symptoms noted above in the HPI.         Medical History  Surgical History Family History Social History   Past Medical History:   Diagnosis Date    Paroxysmal atrial fibrillation (H) 12/1/2022    Dx Jun 2022 [id number] score = 0     Snoring 12/2/2022     Past Surgical History:   Procedure Laterality Date    EP ABLATION PULMONARY VEIN ISOLATION  02/07/2023    cryo-PVs, RF to RSPV, Dr. Pritchard     Family History   Problem Relation Age of Onset    Atrial fibrillation Mother     Dementia Mother     Pacemaker Father     Colon Cancer Brother     Atrial fibrillation Brother         Social History     Socioeconomic History    Marital status:      Spouse name: Not on file    Number of children: 2    Years of education: Not on file    Highest education level: Not on file   Occupational History    Occupation: Tech    Tobacco Use    Smoking status: Never     Smokeless tobacco: Never   Vaping Use    Vaping status: Never Used   Substance and Sexual Activity    Alcohol use: Yes     Comment: 10 drinks per week    Drug use: Never    Sexual activity: Yes     Partners: Female     Birth control/protection: Post-menopausal   Other Topics Concern    Not on file   Social History Narrative    Not on file     Social Determinants of Health     Financial Resource Strain: Not on file   Food Insecurity: Not on file   Transportation Needs: Not on file   Physical Activity: Not on file   Stress: Not on file   Social Connections: Not on file   Intimate Partner Violence: Not on file   Housing Stability: Not on file           Medications  Allergies   Current Outpatient Medications   Medication Sig Dispense Refill    aspirin 81 MG EC tablet Take 1 tablet (81 mg) by mouth daily      rosuvastatin (CRESTOR) 20 MG tablet Take 20 mg by mouth daily (Patient not taking: Reported on 6/9/2023)       No Known Allergies       Lab Results    Chemistry/lipid CBC Cardiac Enzymes/BNP/TSH/INR   Recent Labs   Lab Test 02/07/23  1031 01/31/23  1349    141   POTASSIUM 4.5 4.4   CHLORIDE 107 106   CO2 26 26   ANIONGAP 7 9   GLC 91 82   BUN 22.0 22.1   CR 0.87 0.84   SCARLETT 9.0 9.3        CBC RESULTS: Recent Labs   Lab Test 02/07/23  1031   WBC 4.7   RBC 4.87   HGB 14.3   HCT 43.8   MCV 90   MCH 29.4   MCHC 32.6   RDW 12.8         No results for input(s): TROPONINI in the last 32817 hours.  No results for input(s): BNP, NTBNPI, NTBNP in the last 37342 hours.  Recent Labs   Lab Test 06/07/22  1314   TSH 1.16                    Thank you for allowing me to participate in the care of your patient.      Sincerely,     KARI Roach CNP     [facility]  cc:   KARI Roach CNP  [address], [address]  [place] [address]

## 2023-06-09 NOTE — PATIENT INSTRUCTIONS
Asher Cross,    It was a pleasure to see you today at the [facility].     My recommendations after this visit include:    Take aspirin 81 mg daily    Follow up in February, 1 year post ablation    Hali Osei CNP  [facility], Electrophysiology  [phone]  EP nurses [phone]

## 2023-06-09 NOTE — PROGRESS NOTES
HEART CARE ELECTROPHYSIOLOGY NOTE      [facility]  [phone]      Assessment/Recommendations   Assessment/Plan:  Paroxysmal Atrial Fibrillation: No symptomatology or evidence of A-fib.  He remains off membrane active antiarrhythmic medications.  He discussed risk for AF recurrence post ablation and avoidance of possible triggers.    He has a [id number] score of 0.  HAS-BLED score of 0.  It was discontinued 3 months post ablation.  Recommend aspirin 81 mg daily for stroke prophylaxis.       Follow-up 1 year post PVI     History of Present Illness/Subjective    HPI: Asher Cross is a 62 year old male who comes in for EP follow up of atrial fibrillation.  He has a history of atrial fibrillation and hyperlipidemia.   Due to nocturnal onset of A. fib, evaluation for sleep apnea was recommended by Dr. Pritchard.  Sleep study showed mild snoring, but no sleep apnea.    He was diagnosed with atrial fibrillation with rapid ventricular response in June 2022.  He underwent urgent cardioversion in the ED.  Symptoms consist of palpitations, weakness, and shortness of breath.  He had another episode in December that lasted all day and spontaneously converted overnight.  He was using pill in the pocket flecainide with termination of AF shortly after taking.  He underwent ablation by Dr. Pritchard on 2/7/2023 with cryo to all 4 pulmonary veins and additional RF to the anterior RSPV.  Left atrial mapping showed normal voltage without attenuation or signal fractionation.  He is on Eliquis for stroke prophylaxis for 3 months post ablation.    Asher states that he feels well.  He is sometimes more aware of his heartbeat and notes that it is generally faster than prior to ablation, but he has not had any A-fib.  He denies chest discomfort, palpitations, abdominal fullness/bloating or peripheral edema, shortness of breath, paroxysmal nocturnal dyspnea, orthopnea, lightheadedness, dizziness, pre-syncope, or  syncope.    Cardiographics (EKG personally reviewed):  EKG done 1/31/2023 shows sinus bradycardia 53 bpm, incomplete right bundle branch block,  ms, QT/QTc 416/390 ms.  EKG done 6/7/2022 shows atrial fibrillation with mildly elevated ventricular response at 98 bpm    MCOT worn 5/23/2023 to 6/5/2023 shows sinus rhythm with an average heart rate of 77 bpm and a range of 50 to 120 bpm.  No significant bradycardia or pauses.  No atrial fibrillation or other tachyarrhythmia.  No significant ventricular ectopy.    Echo done 6/24/2022:  The left ventricle is normal in size.  Left ventricular systolic function is normal.  The visual ejection fraction is 55-60%.  No regional wall motion abnormalities noted.  Diastolic Doppler findings (E/E' ratio and/or other parameters) suggest left  ventricular filling pressures are normal.  The aortic valve is trileaflet with aortic valve sclerosis.  There is trace aortic regurgitation.  Sinus rhythm was noted.  There is no comparison study available.    Stress echo done 12/19/2022:  1. Normal stress echocardiogram without evidence of stress induced ischemia.  2. Normal resting LV systolic performance with an ejection fraction of 55-60%.  There is normal improvement in left ventricular systolic performance with a  peak ejection fraction of 70-75%.  3. No ECG evidence of ischemia.  4. No anginal chest pain reported with exercise.  5. Good functional capacity for age.  6. No atrial fibrillation or other rhythm disturbances were detected.    I have reviewed and updated the patient's Past Medical History, Social History, Family History and Medication List.  Outside records personally reviewed.     Physical Examination  Review of Systems   Vitals: /60 (BP Location: Right arm, Patient Position: Sitting, Cuff Size: Adult Large)   Pulse 80   Resp 16   Wt 81.6 kg (180 lb)   BMI 23.11 kg/m    BMI= Body mass index is 23.11 kg/m .  Wt Readings from Last 3 Encounters:   06/09/23 81.6  kg (180 lb)   06/08/23 80.7 kg (178 lb)   03/21/23 80.7 kg (178 lb)       General Appearance:   Alert, well-appearing and in no acute distress.   HEENT: Atraumatic, normocephalic.  No scleral icterus, normal conjunctivae, EOMs intact, PERRL.  Mucous membranes pink and moist.   Chest/Lungs:   Chest symmetric, spine straight.  Respirations unlabored.  Lungs are clear to auscultation.   Cardiovascular:   Regular rate and rhythm.  Normal first and second heart sounds with no murmurs, rubs, or gallops; radial pulses are intact, no edema.   Abdomen:  Soft, nondistended, bowel sounds present.   Extremities: No cyanosis or clubbing.   Musculoskeletal: Moves all extremities.    Skin: Warm, dry, intact.    Neurologic: Mood and affect are appropriate.  Alert and oriented to person, place, time, and situation.     ROS: 10 point ROS neg other than the symptoms noted above in the HPI.         Medical History  Surgical History Family History Social History   Past Medical History:   Diagnosis Date     Paroxysmal atrial fibrillation (H) 12/1/2022    Dx Jun 2022 [id number] score = 0      Snoring 12/2/2022     Past Surgical History:   Procedure Laterality Date     EP ABLATION PULMONARY VEIN ISOLATION  02/07/2023    cryo-PVs, RF to RSPV, Dr. Pritchard     Family History   Problem Relation Age of Onset     Atrial fibrillation Mother      Dementia Mother      Pacemaker Father      Colon Cancer Brother      Atrial fibrillation Brother         Social History     Socioeconomic History     Marital status:      Spouse name: Not on file     Number of children: 2     Years of education: Not on file     Highest education level: Not on file   Occupational History     Occupation: Tech    Tobacco Use     Smoking status: Never     Smokeless tobacco: Never   Vaping Use     Vaping status: Never Used   Substance and Sexual Activity     Alcohol use: Yes     Comment: 10 drinks per week     Drug use: Never     Sexual activity: Yes      Partners: Female     Birth control/protection: Post-menopausal   Other Topics Concern     Not on file   Social History Narrative     Not on file     Social Determinants of Health     Financial Resource Strain: Not on file   Food Insecurity: Not on file   Transportation Needs: Not on file   Physical Activity: Not on file   Stress: Not on file   Social Connections: Not on file   Intimate Partner Violence: Not on file   Housing Stability: Not on file           Medications  Allergies   Current Outpatient Medications   Medication Sig Dispense Refill     aspirin 81 MG EC tablet Take 1 tablet (81 mg) by mouth daily       rosuvastatin (CRESTOR) 20 MG tablet Take 20 mg by mouth daily (Patient not taking: Reported on 6/9/2023)       No Known Allergies       Lab Results    Chemistry/lipid CBC Cardiac Enzymes/BNP/TSH/INR   Recent Labs   Lab Test 02/07/23  1031 01/31/23  1349    141   POTASSIUM 4.5 4.4   CHLORIDE 107 106   CO2 26 26   ANIONGAP 7 9   GLC 91 82   BUN 22.0 22.1   CR 0.87 0.84   SCARLETT 9.0 9.3        CBC RESULTS: Recent Labs   Lab Test 02/07/23  1031   WBC 4.7   RBC 4.87   HGB 14.3   HCT 43.8   MCV 90   MCH 29.4   MCHC 32.6   RDW 12.8         No results for input(s): TROPONINI in the last 04192 hours.  No results for input(s): BNP, NTBNPI, NTBNP in the last 44421 hours.  Recent Labs   Lab Test 06/07/22  1314   TSH 1.16

## 2023-06-12 ENCOUNTER — HOSPITAL ENCOUNTER (OUTPATIENT)
Dept: CARDIOLOGY | Facility: CLINIC | Age: 63
Discharge: HOME | End: 2023-06-12
Attending: FAMILY MEDICINE | Admitting: FAMILY MEDICINE
Payer: COMMERCIAL

## 2023-06-12 DIAGNOSIS — E78.5 DYSLIPIDEMIA: ICD-10-CM

## 2023-06-12 PROCEDURE — 75571 CT HRT W/O DYE W/CA TEST: CPT | Mod: 26 | Performed by: INTERNAL MEDICINE

## 2023-06-12 PROCEDURE — 75571 CT HRT W/O DYE W/CA TEST: CPT

## 2024-08-10 ENCOUNTER — HEALTH MAINTENANCE LETTER (OUTPATIENT)
Age: 64
End: 2024-08-10

## 2024-10-19 ENCOUNTER — HEALTH MAINTENANCE LETTER (OUTPATIENT)
Age: 64
End: 2024-10-19

## 2025-08-12 ENCOUNTER — TELEPHONE (OUTPATIENT)
Dept: CARDIOLOGY | Facility: CLINIC | Age: 65
End: 2025-08-12
Payer: COMMERCIAL

## 2025-08-12 ENCOUNTER — ORDERS ONLY (AUTO-RELEASED) (OUTPATIENT)
Dept: CARDIOLOGY | Facility: CLINIC | Age: 65
End: 2025-08-12
Payer: COMMERCIAL

## 2025-08-12 DIAGNOSIS — I48.0 PAROXYSMAL ATRIAL FIBRILLATION (H): ICD-10-CM

## 2025-08-12 DIAGNOSIS — I48.0 PAROXYSMAL ATRIAL FIBRILLATION (H): Primary | ICD-10-CM

## 2025-08-28 LAB — CV ZIO PRELIM RESULTS: NORMAL

## 2025-08-29 ENCOUNTER — RESULTS FOLLOW-UP (OUTPATIENT)
Dept: CARDIOLOGY | Facility: CLINIC | Age: 65
End: 2025-08-29

## (undated) DEVICE — INTRODUCER SHEATH FAST-CATH 8FRX12CM 406112

## (undated) DEVICE — Device

## (undated) DEVICE — KIT ENSITE SURFACE ELECTRODE ENSITE-SEK-5-01

## (undated) DEVICE — CATHETER ICE VIEWFLEX XTRA

## (undated) DEVICE — VALVE ROTATING HEMOSTATIC ACS 23242

## (undated) DEVICE — WIRE GUIDE 0.035"X260CM AMPTLAZ XSTIFF CVD THSCF-35-260-3-A

## (undated) DEVICE — CUSTOM PACK EP

## (undated) DEVICE — CATH EP INQUIRY DECAPOLAR 6FR X 110CM LG CRV

## (undated) DEVICE — CATH UMBILICAL COAX CBL 203CXC

## (undated) DEVICE — INTRO SHEATH TERUMO 10FRX25CM PINNACLE RSS006

## (undated) DEVICE — CATH MAPPING ADVISOR HD GRID SE D-AVHD-DF16

## (undated) DEVICE — ELECTRODE ADULT PACING MULTI P-211-M1

## (undated) DEVICE — INTRO MICRO MINI STICK 5FR STIFF NITINOL

## (undated) DEVICE — SHEATH CHECK FLO PERFORMER RCFW-12.0-38

## (undated) DEVICE — TUBING KIT COOL POINT

## (undated) DEVICE — MANIFOLD 4 GANG 504BN-R

## (undated) DEVICE — CABLE BIPOLAR PACING THRESHOLD 8 WHITE

## (undated) DEVICE — CABLE UMBILICAL CRYOABLATION ELEC

## (undated) DEVICE — CATH ARCTIC FRONT ADVANCE 2AF284

## (undated) DEVICE — SHEATH PINNACLE 9/25/038 RSS905

## (undated) DEVICE — TRANSPAC IV MONITORING KIT WI SAFESET RESERVOIR AND TWO BLOOD SAMPLING PORTS 84" TUBING DISPOSABLE TRANSDUCER

## (undated) DEVICE — CATH TRANSSEPTAL VERSACROSS 45D 63X230CM VXSK0032

## (undated) DEVICE — SHEATH STEERABLE FLEXCATH ADV 12F 4FC12

## (undated) DEVICE — SHEATH AGILIS 8.5FR X 71CM 408310

## (undated) RX ORDER — FENTANYL CITRATE 50 UG/ML
INJECTION, SOLUTION INTRAMUSCULAR; INTRAVENOUS
Status: DISPENSED
Start: 2023-02-07

## (undated) RX ORDER — PROTAMINE SULFATE 10 MG/ML
INJECTION, SOLUTION INTRAVENOUS
Status: DISPENSED
Start: 2023-02-07

## (undated) RX ORDER — PROPOFOL 10 MG/ML
INJECTION, EMULSION INTRAVENOUS
Status: DISPENSED
Start: 2023-02-07

## (undated) RX ORDER — HEPARIN SODIUM 10000 [USP'U]/100ML
INJECTION, SOLUTION INTRAVENOUS
Status: DISPENSED
Start: 2023-02-07